# Patient Record
Sex: MALE | Race: BLACK OR AFRICAN AMERICAN | Employment: UNEMPLOYED | URBAN - METROPOLITAN AREA
[De-identification: names, ages, dates, MRNs, and addresses within clinical notes are randomized per-mention and may not be internally consistent; named-entity substitution may affect disease eponyms.]

---

## 2019-08-13 ENCOUNTER — HOSPITAL ENCOUNTER (EMERGENCY)
Age: 69
Discharge: HOME | End: 2019-08-13
Attending: EMERGENCY MEDICINE
Payer: MEDICARE

## 2019-08-13 VITALS
BODY MASS INDEX: 34.3 KG/M2 | TEMPERATURE: 97.7 F | OXYGEN SATURATION: 100 % | SYSTOLIC BLOOD PRESSURE: 115 MMHG | HEART RATE: 85 BPM | HEIGHT: 71 IN | WEIGHT: 245 LBS | RESPIRATION RATE: 18 BRPM | DIASTOLIC BLOOD PRESSURE: 75 MMHG

## 2019-08-13 DIAGNOSIS — R11.0 NAUSEA WITHOUT VOMITING: Primary | ICD-10-CM

## 2019-08-13 LAB — GLUCOSE BLD STRIP.AUTO-MCNC: 175 MG/DL (ref 65–100)

## 2019-08-13 PROCEDURE — 74011250637 HC RX REV CODE- 250/637: Performed by: EMERGENCY MEDICINE

## 2019-08-13 PROCEDURE — 82962 GLUCOSE BLOOD TEST: CPT

## 2019-08-13 PROCEDURE — 99283 EMERGENCY DEPT VISIT LOW MDM: CPT | Performed by: EMERGENCY MEDICINE

## 2019-08-13 RX ORDER — ONDANSETRON 4 MG/1
4 TABLET, ORALLY DISINTEGRATING ORAL
Qty: 11 TAB | Refills: 1 | Status: SHIPPED | OUTPATIENT
Start: 2019-08-13

## 2019-08-13 RX ORDER — ONDANSETRON 4 MG/1
4 TABLET, ORALLY DISINTEGRATING ORAL
Status: COMPLETED | OUTPATIENT
Start: 2019-08-13 | End: 2019-08-13

## 2019-08-13 RX ADMIN — ONDANSETRON 4 MG: 4 TABLET, ORALLY DISINTEGRATING ORAL at 03:45

## 2019-08-13 NOTE — ED PROVIDER NOTES
Patient is a 65-year-old male with a history of diabetes, hypertension, schizophrenia. He comes to the ER [facility] with multiple complaints. He states about 2 weeks ago he was bit by a brown snake on his right calf. But the past several days he has had some nausea and some epigastric abdominal pain. He denies any vomiting. He states he takes Prilosec regularly. He thinks he might of been dehydrated from being outside in the heat. He denies any chest pain or dyspnea. The history is provided by the patient. Snake Bite    Pertinent negatives include no back pain and no neck pain. Past Medical History:   Diagnosis Date    Arthritis     DM (diabetes mellitus) ([facility] [address]     Hypertension     Psychiatric disorder     schizophrenia       No past surgical history on file. No family history on file.     Social History     Socioeconomic History    Marital status:      Spouse name: Not on file    Number of children: Not on file    Years of education: Not on file    Highest education level: Not on file   Occupational History    Not on file   Social Needs    Financial resource strain: Not on file    Food insecurity:     Worry: Not on file     Inability: Not on file    Transportation needs:     Medical: Not on file     Non-medical: Not on file   Tobacco Use    Smoking status: Former Smoker    Smokeless tobacco: Former User     Quit date: 3/8/2005   Substance and Sexual Activity    Alcohol use: Not on file    Drug use: Not on file    Sexual activity: Not on file   Lifestyle    Physical activity:     Days per week: Not on file     Minutes per session: Not on file    Stress: Not on file   Relationships    Social connections:     Talks on phone: Not on file     Gets together: Not on file     Attends Synagogue service: Not on file     Active member of club or organization: Not on file     Attends meetings of clubs or organizations: Not on file     Relationship status: Not on file   [address] [place] [address] Intimate partner violence:     Fear of current or ex partner: Not on file     Emotionally abused: Not on file     Physically abused: Not on file     Forced sexual activity: Not on file   Other Topics Concern    Not on file   Social History Narrative    Not on file         ALLERGIES: Amoxicillin    Review of Systems   Constitutional: Negative for chills, fatigue and fever. HENT: Negative for congestion, rhinorrhea and sore throat. Eyes: Negative for pain, discharge and visual disturbance. Respiratory: Negative for cough and shortness of breath. Cardiovascular: Negative for chest pain and palpitations. Gastrointestinal: Positive for nausea. Negative for abdominal pain and diarrhea. Endocrine: Negative for polydipsia and polyuria. Genitourinary: Negative for dysuria, frequency and urgency. Musculoskeletal: Negative for back pain and neck pain. Skin: Negative for rash. Neurological: Negative for seizures, syncope and weakness. Hematological: Negative. Vitals:    08/13/19 0308   BP: 106/72   Pulse: 82   Resp: 18   Temp: 97.7 °F (36.5 °C)   SpO2: 100%   Weight: 111.1 kg (245 lb)   Height: 5' 11\" (1.803 m)            Physical Exam   Constitutional: He is oriented to person, place, and time. He appears well-developed and well-nourished. HENT:   Head: Normocephalic and atraumatic. Eyes: Pupils are equal, round, and reactive to light. Conjunctivae and EOM are normal.   Neck: Normal range of motion. Neck supple. Cardiovascular: Normal rate, regular rhythm and intact distal pulses. Pulmonary/Chest: Effort normal and breath sounds normal.   Abdominal: Soft. There is no tenderness. There is no rebound and no guarding. Musculoskeletal: Normal range of motion. He exhibits no edema or tenderness. Lymphadenopathy:     He has no cervical adenopathy. Neurological: He is alert and oriented to person, place, and time. He has normal strength. No cranial nerve deficit or sensory deficit.  GCS eye subscore is 4. GCS verbal subscore is 5. GCS motor subscore is 6. Skin: Skin is warm and dry. No rash noted. Psychiatric: His mood appears anxious. Nursing note and vitals reviewed. MDM  Number of Diagnoses or Management Options  Diagnosis management comments: Zofran for nausea here    Blood sugar 130  Completely benign abdomen. I think his schizophrenia is a primary problem. However no indication for psychiatric consult or involuntary commitment at this time. Advised him to follow-up with his doctor. Voice dictation software was used during the making of this note. This software is not perfect and grammatical and other typographical errors may be present. This note has been proofread, but may still contain errors.   Rajiv Arias MD; 8/13/2019 @3:26 AM   ===================================================================         Amount and/or Complexity of Data Reviewed  Clinical lab tests: reviewed  Review and summarize past medical records: yes  Independent visualization of images, tracings, or specimens: yes    Risk of Complications, Morbidity, and/or Mortality  Presenting problems: minimal  Diagnostic procedures: minimal  Management options: minimal    Patient Progress  Patient progress: stable         Procedures

## 2019-08-13 NOTE — ED TRIAGE NOTES
Pt states he was bit by a snake almost 2 weeks ago. States it was a brown snake on his left calve. States he came in tonight because his leg felt weak yesterday and he didn't feel good. States he was outside yesterday in the heat and got sick. Denies vomiting. Denies chest px.

## 2019-08-13 NOTE — ED NOTES
I have reviewed discharge instructions with the patient. The patient verbalized understanding. Patient left ED via Discharge Method: ambulatory to Home with self. Opportunity for questions and clarification provided. Patient given 1 scripts. To continue your aftercare when you leave the hospital, you may receive an automated call from our care team to check in on how you are doing. This is a free service and part of our promise to provide the best care and service to meet your aftercare needs.  If you have questions, or wish to unsubscribe from this service please call [phone]. Thank you for Choosing our [facility] Emergency Department.

## 2021-09-05 ENCOUNTER — HOSPITAL ENCOUNTER (EMERGENCY)
Age: 71
Discharge: HOME | End: 2021-09-05
Attending: EMERGENCY MEDICINE
Payer: MEDICARE

## 2021-09-05 VITALS
SYSTOLIC BLOOD PRESSURE: 154 MMHG | WEIGHT: 245 LBS | HEIGHT: 71 IN | RESPIRATION RATE: 17 BRPM | HEART RATE: 74 BPM | BODY MASS INDEX: 34.3 KG/M2 | TEMPERATURE: 97.5 F | DIASTOLIC BLOOD PRESSURE: 88 MMHG | OXYGEN SATURATION: 96 %

## 2021-09-05 DIAGNOSIS — Z77.098 EXPOSURE TO CHEMICAL IRRITANT: ICD-10-CM

## 2021-09-05 DIAGNOSIS — J45.20 MILD INTERMITTENT REACTIVE AIRWAY DISEASE WITHOUT COMPLICATION: ICD-10-CM

## 2021-09-05 DIAGNOSIS — H10.021 ACUTE PURULENT CONJUNCTIVITIS, RIGHT: Primary | ICD-10-CM

## 2021-09-05 DIAGNOSIS — S05.01XA ABRASION OF RIGHT CORNEA, INITIAL ENCOUNTER: ICD-10-CM

## 2021-09-05 PROCEDURE — 99283 EMERGENCY DEPT VISIT LOW MDM: CPT

## 2021-09-05 RX ORDER — ALBUTEROL SULFATE 90 UG/1
2 AEROSOL, METERED RESPIRATORY (INHALATION)
Qty: 1 EACH | Refills: 0 | Status: SHIPPED | OUTPATIENT
Start: 2021-09-05

## 2021-09-05 RX ORDER — TETRACAINE HYDROCHLORIDE 5 MG/ML
2 SOLUTION OPHTHALMIC
Status: DISCONTINUED | OUTPATIENT
Start: 2021-09-05 | End: 2021-09-05 | Stop reason: HOSPADM

## 2021-09-05 RX ORDER — GENTAMICIN SULFATE 3 MG/ML
1 SOLUTION/ DROPS OPHTHALMIC 4 TIMES DAILY
Qty: 5 ML | Refills: 0 | Status: SHIPPED | OUTPATIENT
Start: 2021-09-05

## 2021-09-05 NOTE — DISCHARGE INSTRUCTIONS
Use eyedrops as prescribed  If your eye symptoms do not improve over the next 24 to 36 hours, please contact the ophthalmologist to have a recheck appointment  Use inhaler as needed  Avoid exposure to chemicals and other irritants  Call your doctor/follow up doctor to set up appointment for recheck visit  Return to ER for any worsening symptoms or new problems which may arise

## 2021-09-05 NOTE — ED PROVIDER NOTES
The history is provided by the patient. Eye Pain   This is a new problem. The current episode started yesterday. The problem occurs constantly. The problem has been gradually worsening. The right eye is affected. The injury mechanism was a chemical exposure. The pain is mild. There is no history of trauma to the eye. There is no known exposure to pink eye. Associated symptoms include blurred vision, discharge, foreign body sensation, eye redness, itching and pain. Pertinent negatives include no numbness, no nausea, no vomiting, no fever and no dizziness. He has tried nothing for the symptoms. Past Medical History:   Diagnosis Date    Arthritis     DM [facility]diabetes mellitus) ([facility] [address]     Hypertension     Psychiatric disorder     schizophrenia       No past surgical history on file. No family history on file. Social History     Socioeconomic History    Marital status:      Spouse name: Not on file    Number of children: Not on file    Years of education: Not on file    Highest education level: Not on file   Occupational History    Not on file   Tobacco Use    Smoking status: Former Smoker    Smokeless tobacco: Former User     Quit date: 3/8/2005   Substance and Sexual Activity    Alcohol use: Not on file    Drug use: Not on file    Sexual activity: Not on file   Other Topics Concern    Not on file   Social History Narrative    Not on file     Social Determinants of Health     Financial Resource Strain:     Difficulty of Paying Living Expenses:    Food Insecurity:     Worried About [address] [employer] in the Last Year:     [address] in the Last Year:    Transportation Needs:     Lack of Transportation (Medical):      Lack of Transportation (Non-Medical):    Physical Activity:     Days of Exercise per Week:     Minutes of Exercise per Session:    Stress:     Feeling of Stress :    Social Connections:     Frequency of Communication with Friends and Family:     Frequency of Social Gatherings with Friends and Family:     Attends Sikhism Services:     Active Member of Clubs or Organizations:     Attends Club or Organization Meetings:     Marital Status:    Intimate Partner Violence:     Fear of Current or Ex-Partner:     Emotionally Abused:     Physically Abused:     Sexually Abused: ALLERGIES: Amoxicillin    Review of Systems   Constitutional: Negative for activity change, chills, diaphoresis and fever. HENT: Negative for dental problem, hearing loss, nosebleeds, rhinorrhea and sore throat. Eyes: Positive for blurred vision, pain, discharge and redness. Negative for visual disturbance. Respiratory: Positive for shortness of breath. Negative for cough and chest tightness. Cardiovascular: Negative for chest pain, palpitations and leg swelling. Gastrointestinal: Negative for abdominal pain, constipation, diarrhea, nausea and vomiting. Endocrine: Negative for cold intolerance, heat intolerance, polydipsia and polyuria. Genitourinary: Negative for dysuria and flank pain. Musculoskeletal: Negative for arthralgias, back pain, joint swelling, myalgias and neck pain. Skin: Positive for itching. Negative for pallor and rash. Allergic/Immunologic: Negative for environmental allergies and food allergies. Neurological: Negative for dizziness, tremors, light-headedness, numbness and headaches. Hematological: Negative for adenopathy. Does not bruise/bleed easily. Psychiatric/Behavioral: Negative for confusion and dysphoric mood. The patient is not nervous/anxious and is not hyperactive. All other systems reviewed and are negative. Vitals:    09/05/21 0643   BP: (!) 168/86   Pulse: 75   Resp: 20   Temp: 97.5 °F (36.4 °C)   SpO2: 97%   Weight: 111.1 kg (245 lb)   Height: 5' 11\" (1.803 m)            Physical Exam  Vitals and nursing note reviewed. Constitutional:       General: He is in acute distress. Appearance: Normal appearance.  He is well-developed. He is obese. HENT:      Head: Normocephalic and atraumatic. Right Ear: External ear normal.      Left Ear: External ear normal.      Mouth/Throat:      Mouth: Mucous membranes are moist.      Pharynx: Oropharynx is clear. No oropharyngeal exudate or posterior oropharyngeal erythema. Eyes:      General: No scleral icterus. Right eye: Discharge present. No foreign body. Left eye: No discharge. Extraocular Movements: Extraocular movements intact. Right eye: Normal extraocular motion and no nystagmus. Left eye: Normal extraocular motion and no nystagmus. Conjunctiva/sclera:      Right eye: Right conjunctiva is injected. Chemosis and exudate present. No hemorrhage. Pupils: Pupils are equal, round, and reactive to light. Neck:      Thyroid: No thyromegaly. Vascular: No JVD. Cardiovascular:      Rate and Rhythm: Normal rate and regular rhythm. Pulses: Normal pulses. Heart sounds: Normal heart sounds. Pulmonary:      Effort: Pulmonary effort is normal. No respiratory distress. Breath sounds: Normal breath sounds. No stridor. No wheezing. Musculoskeletal:         General: No tenderness or deformity. Normal range of motion. Cervical back: Normal range of motion and neck supple. Skin:     General: Skin is warm and dry. Capillary Refill: Capillary refill takes less than 2 seconds. Findings: No rash. Neurological:      General: No focal deficit present. Mental Status: He is alert and oriented to person, place, and time. Cranial Nerves: No cranial nerve deficit. Sensory: No sensory deficit. Motor: No abnormal muscle tone. Coordination: Coordination normal.   Psychiatric:         Mood and Affect: Mood normal.         Behavior: Behavior normal.         Thought Content:  Thought content normal.         Judgment: Judgment normal.          MDM  Number of Diagnoses or Management Options  Abrasion of right cornea, initial encounter: new and requires workup  Acute purulent conjunctivitis, right: new and requires workup  Exposure to chemical irritant: new and does not require workup  Mild intermittent reactive airway disease without complication: new and does not require workup  Diagnosis management comments: 79-year-old male with redness and swelling purulent discharge and itching to his right eye  Likely chemical exposure to some insecticides he was using yesterday  His lungs are clear currently although it does sound like he may have had some reactive wheezing from the exposure as well. Amount and/or Complexity of Data Reviewed  Tests in the medicine section of CPT®: ordered and reviewed  Review and summarize past medical records: yes  Independent visualization of images, tracings, or specimens: yes    Risk of Complications, Morbidity, and/or Mortality  Presenting problems: moderate  Diagnostic procedures: low  Management options: low  General comments: Elements of this note have been dictated via voice recognition software. Text and phrases may be limited by the accuracy of the software. The chart has been reviewed, but errors may still be present.       Patient Progress  Patient progress: stable         Procedures

## 2021-09-05 NOTE — ED NOTES
Pt's hair and skin have a strong scent of chemicals -- gasoline, bug spray, or paint. Pt reports that he recently sprayed his whole house with insecticide (and house perimeter with a strong outdoor insecticide). He also just last night painted his closet. However, pt noticed eye irritation prior to the painting.

## 2021-09-05 NOTE — ED NOTES
I have reviewed discharge instructions with the patient. The patient verbalized understanding. Patient left ED via Discharge Method: ambulatory to Home with self  Opportunity for questions and clarification provided. Patient given 2 scripts. To continue your aftercare when you leave the hospital, you may receive an automated call from our care team to check in on how you are doing. This is a free service and part of our promise to provide the best care and service to meet your aftercare needs.  If you have questions, or wish to unsubscribe from this service please call [phone]. Thank you for Choosing our [facility] Emergency Department.

## 2021-09-05 NOTE — ED TRIAGE NOTES
Arrives with face mask in place. Reports possible insect bite to back of neck Friday. Reports right eye pain with redness, swelling and drainage. States \"feel like it got a rock in it\". Denies injury/trauma. Also reports \"my air feels weird, like its dirty or something\".

## 2023-04-26 ENCOUNTER — APPOINTMENT (OUTPATIENT)
Dept: CT IMAGING | Age: 73
End: 2023-04-26
Payer: MEDICARE

## 2023-04-26 ENCOUNTER — APPOINTMENT (OUTPATIENT)
Dept: GENERAL RADIOLOGY | Age: 73
End: 2023-04-26
Payer: MEDICARE

## 2023-04-26 ENCOUNTER — HOSPITAL ENCOUNTER (INPATIENT)
Age: 73
LOS: 3 days | Discharge: HOME | End: 2023-04-29
Attending: EMERGENCY MEDICINE | Admitting: INTERNAL MEDICINE
Payer: MEDICARE

## 2023-04-26 DIAGNOSIS — E11.65 UNCONTROLLED TYPE 2 DIABETES MELLITUS WITH HYPERGLYCEMIA (HCC): ICD-10-CM

## 2023-04-26 DIAGNOSIS — N17.9 ACUTE KIDNEY INJURY (HCC): Primary | ICD-10-CM

## 2023-04-26 DIAGNOSIS — E11.10 DIABETIC KETOACIDOSIS WITHOUT COMA ASSOCIATED WITH TYPE 2 DIABETES MELLITUS (HCC): ICD-10-CM

## 2023-04-26 PROBLEM — E11.9 DIABETES (HCC): Status: ACTIVE | Noted: 2023-04-26

## 2023-04-26 PROBLEM — E66.9 OBESITY: Status: ACTIVE | Noted: 2023-04-26

## 2023-04-26 PROBLEM — R73.9 HYPERGLYCEMIA: Status: ACTIVE | Noted: 2023-04-26

## 2023-04-26 LAB
ALBUMIN SERPL-MCNC: 3.9 G/DL (ref 3.2–4.6)
ALBUMIN/GLOB SERPL: 0.9 (ref 0.4–1.6)
ALP SERPL-CCNC: 83 U/L (ref 50–136)
ALT SERPL-CCNC: 34 U/L (ref 12–65)
ANION GAP SERPL CALC-SCNC: 12 MMOL/L (ref 2–11)
AST SERPL-CCNC: 27 U/L (ref 15–37)
BASOPHILS # BLD: 0.1 K/UL (ref 0–0.2)
BASOPHILS NFR BLD: 1 % (ref 0–2)
BILIRUB SERPL-MCNC: 0.6 MG/DL (ref 0.2–1.1)
BILIRUB UR QL: NEGATIVE
BUN SERPL-MCNC: 48 MG/DL (ref 8–23)
CALCIUM SERPL-MCNC: 9.7 MG/DL (ref 8.3–10.4)
CHLORIDE SERPL-SCNC: 91 MMOL/L (ref 101–110)
CHP ED QC CHECK: NORMAL
CO2 SERPL-SCNC: 20 MMOL/L (ref 21–32)
CREAT SERPL-MCNC: 2.8 MG/DL (ref 0.8–1.5)
DIFFERENTIAL METHOD BLD: ABNORMAL
EOSINOPHIL # BLD: 0.1 K/UL (ref 0–0.8)
EOSINOPHIL NFR BLD: 1 % (ref 0.5–7.8)
ERYTHROCYTE [DISTWIDTH] IN BLOOD BY AUTOMATED COUNT: 12.6 % (ref 11.9–14.6)
GLOBULIN SER CALC-MCNC: 4.5 G/DL (ref 2.8–4.5)
GLUCOSE BLD STRIP.AUTO-MCNC: 547 MG/DL (ref 65–100)
GLUCOSE BLD-MCNC: NORMAL MG/DL
GLUCOSE SERPL-MCNC: 827 MG/DL (ref 65–100)
GLUCOSE UR QL STRIP.AUTO: 500 MG/DL
HCT VFR BLD AUTO: 48.5 % (ref 41.1–50.3)
HGB BLD-MCNC: 16.5 G/DL (ref 13.6–17.2)
IMM GRANULOCYTES # BLD AUTO: 0 K/UL (ref 0–0.5)
IMM GRANULOCYTES NFR BLD AUTO: 0 % (ref 0–5)
KETONES UR-MCNC: NEGATIVE MG/DL
LACTATE SERPL-SCNC: 2 MMOL/L (ref 0.4–2)
LACTATE SERPL-SCNC: 2.3 MMOL/L (ref 0.4–2)
LEUKOCYTE ESTERASE UR QL STRIP: NEGATIVE
LYMPHOCYTES # BLD: 1.4 K/UL (ref 0.5–4.6)
LYMPHOCYTES NFR BLD: 12 % (ref 13–44)
MCH RBC QN AUTO: 27.9 PG (ref 26.1–32.9)
MCHC RBC AUTO-ENTMCNC: 34 G/DL (ref 31.4–35)
MCV RBC AUTO: 81.9 FL (ref 82–102)
MONOCYTES # BLD: 0.7 K/UL (ref 0.1–1.3)
MONOCYTES NFR BLD: 6 % (ref 4–12)
NEUTS SEG # BLD: 9.4 K/UL (ref 1.7–8.2)
NEUTS SEG NFR BLD: 80 % (ref 43–78)
NITRITE UR QL: NEGATIVE
NRBC # BLD: 0 K/UL (ref 0–0.2)
PH UR: 5 (ref 5–9)
PLATELET # BLD AUTO: 278 K/UL (ref 150–450)
PMV BLD AUTO: 11.3 FL (ref 9.4–12.3)
POTASSIUM SERPL-SCNC: 5.1 MMOL/L (ref 3.5–5.1)
PROT SERPL-MCNC: 8.4 G/DL (ref 6.3–8.2)
PROT UR QL: ABNORMAL MG/DL
RBC # BLD AUTO: 5.92 M/UL (ref 4.23–5.6)
RBC # UR STRIP: ABNORMAL
SERVICE CMNT-IMP: ABNORMAL
SERVICE CMNT-IMP: ABNORMAL
SODIUM SERPL-SCNC: 123 MMOL/L (ref 133–143)
SP GR UR: 1.02 (ref 1–1.02)
UROBILINOGEN UR QL: 0.2 EU/DL (ref 0.2–1)
WBC # BLD AUTO: 11.7 K/UL (ref 4.3–11.1)

## 2023-04-26 PROCEDURE — 81003 URINALYSIS AUTO W/O SCOPE: CPT

## 2023-04-26 PROCEDURE — 82962 GLUCOSE BLOOD TEST: CPT

## 2023-04-26 PROCEDURE — 83036 HEMOGLOBIN GLYCOSYLATED A1C: CPT

## 2023-04-26 PROCEDURE — 83605 ASSAY OF LACTIC ACID: CPT

## 2023-04-26 PROCEDURE — 6360000002 HC RX W HCPCS: Performed by: PHYSICIAN ASSISTANT

## 2023-04-26 PROCEDURE — 99285 EMERGENCY DEPT VISIT HI MDM: CPT | Performed by: EMERGENCY MEDICINE

## 2023-04-26 PROCEDURE — 6370000000 HC RX 637 (ALT 250 FOR IP): Performed by: PHYSICIAN ASSISTANT

## 2023-04-26 PROCEDURE — 80053 COMPREHEN METABOLIC PANEL: CPT

## 2023-04-26 PROCEDURE — 1100000000 HC RM PRIVATE

## 2023-04-26 PROCEDURE — 6370000000 HC RX 637 (ALT 250 FOR IP): Performed by: INTERNAL MEDICINE

## 2023-04-26 PROCEDURE — 74176 CT ABD & PELVIS W/O CONTRAST: CPT

## 2023-04-26 PROCEDURE — 96374 THER/PROPH/DIAG INJ IV PUSH: CPT | Performed by: EMERGENCY MEDICINE

## 2023-04-26 PROCEDURE — 2580000003 HC RX 258: Performed by: INTERNAL MEDICINE

## 2023-04-26 PROCEDURE — 96361 HYDRATE IV INFUSION ADD-ON: CPT | Performed by: EMERGENCY MEDICINE

## 2023-04-26 PROCEDURE — 2580000003 HC RX 258: Performed by: PHYSICIAN ASSISTANT

## 2023-04-26 PROCEDURE — 74022 RADEX COMPL AQT ABD SERIES: CPT

## 2023-04-26 PROCEDURE — 85025 COMPLETE CBC W/AUTO DIFF WBC: CPT

## 2023-04-26 PROCEDURE — 87040 BLOOD CULTURE FOR BACTERIA: CPT

## 2023-04-26 RX ORDER — SODIUM CHLORIDE 9 MG/ML
INJECTION, SOLUTION INTRAVENOUS CONTINUOUS
Status: DISCONTINUED | OUTPATIENT
Start: 2023-04-26 | End: 2023-04-27

## 2023-04-26 RX ORDER — SODIUM CHLORIDE 9 MG/ML
INJECTION, SOLUTION INTRAVENOUS PRN
Status: DISCONTINUED | OUTPATIENT
Start: 2023-04-26 | End: 2023-04-29 | Stop reason: HOSPADM

## 2023-04-26 RX ORDER — ACETAMINOPHEN 650 MG/1
650 SUPPOSITORY RECTAL EVERY 6 HOURS PRN
Status: DISCONTINUED | OUTPATIENT
Start: 2023-04-26 | End: 2023-04-29 | Stop reason: HOSPADM

## 2023-04-26 RX ORDER — SODIUM CHLORIDE 0.9 % (FLUSH) 0.9 %
5-40 SYRINGE (ML) INJECTION PRN
Status: DISCONTINUED | OUTPATIENT
Start: 2023-04-26 | End: 2023-04-29 | Stop reason: HOSPADM

## 2023-04-26 RX ORDER — DEXTROSE AND SODIUM CHLORIDE 5; .45 G/100ML; G/100ML
INJECTION, SOLUTION INTRAVENOUS CONTINUOUS PRN
Status: DISCONTINUED | OUTPATIENT
Start: 2023-04-26 | End: 2023-04-27

## 2023-04-26 RX ORDER — HEPARIN SODIUM 5000 [USP'U]/ML
5000 INJECTION, SOLUTION INTRAVENOUS; SUBCUTANEOUS EVERY 8 HOURS SCHEDULED
Status: DISCONTINUED | OUTPATIENT
Start: 2023-04-27 | End: 2023-04-29 | Stop reason: HOSPADM

## 2023-04-26 RX ORDER — ACETAMINOPHEN 325 MG/1
650 TABLET ORAL EVERY 6 HOURS PRN
Status: DISCONTINUED | OUTPATIENT
Start: 2023-04-26 | End: 2023-04-29 | Stop reason: HOSPADM

## 2023-04-26 RX ORDER — 0.9 % SODIUM CHLORIDE 0.9 %
1000 INTRAVENOUS SOLUTION INTRAVENOUS ONCE
Status: COMPLETED | OUTPATIENT
Start: 2023-04-26 | End: 2023-04-26

## 2023-04-26 RX ORDER — SODIUM CHLORIDE 9 MG/ML
INJECTION, SOLUTION INTRAVENOUS CONTINUOUS
Status: DISCONTINUED | OUTPATIENT
Start: 2023-04-26 | End: 2023-04-26

## 2023-04-26 RX ORDER — ONDANSETRON 2 MG/ML
4 INJECTION INTRAMUSCULAR; INTRAVENOUS EVERY 6 HOURS PRN
Status: DISCONTINUED | OUTPATIENT
Start: 2023-04-26 | End: 2023-04-29 | Stop reason: HOSPADM

## 2023-04-26 RX ORDER — ENOXAPARIN SODIUM 100 MG/ML
30 INJECTION SUBCUTANEOUS 2 TIMES DAILY
Status: DISCONTINUED | OUTPATIENT
Start: 2023-04-26 | End: 2023-04-26 | Stop reason: ALTCHOICE

## 2023-04-26 RX ORDER — ONDANSETRON 4 MG/1
4 TABLET, ORALLY DISINTEGRATING ORAL EVERY 8 HOURS PRN
Status: DISCONTINUED | OUTPATIENT
Start: 2023-04-26 | End: 2023-04-29 | Stop reason: HOSPADM

## 2023-04-26 RX ORDER — POTASSIUM CHLORIDE 7.45 MG/ML
10 INJECTION INTRAVENOUS PRN
Status: DISCONTINUED | OUTPATIENT
Start: 2023-04-26 | End: 2023-04-29 | Stop reason: HOSPADM

## 2023-04-26 RX ORDER — SODIUM CHLORIDE 0.9 % (FLUSH) 0.9 %
5-40 SYRINGE (ML) INJECTION EVERY 12 HOURS SCHEDULED
Status: DISCONTINUED | OUTPATIENT
Start: 2023-04-26 | End: 2023-04-29 | Stop reason: HOSPADM

## 2023-04-26 RX ORDER — MAGNESIUM SULFATE 1 G/100ML
1000 INJECTION INTRAVENOUS PRN
Status: DISCONTINUED | OUTPATIENT
Start: 2023-04-26 | End: 2023-04-29 | Stop reason: HOSPADM

## 2023-04-26 RX ORDER — 0.9 % SODIUM CHLORIDE 0.9 %
1000 INTRAVENOUS SOLUTION INTRAVENOUS
Status: COMPLETED | OUTPATIENT
Start: 2023-04-26 | End: 2023-04-26

## 2023-04-26 RX ORDER — 0.9 % SODIUM CHLORIDE 0.9 %
15 INTRAVENOUS SOLUTION INTRAVENOUS ONCE
Status: DISCONTINUED | OUTPATIENT
Start: 2023-04-26 | End: 2023-04-26

## 2023-04-26 RX ORDER — ONDANSETRON 2 MG/ML
4 INJECTION INTRAMUSCULAR; INTRAVENOUS
Status: COMPLETED | OUTPATIENT
Start: 2023-04-26 | End: 2023-04-26

## 2023-04-26 RX ORDER — POLYETHYLENE GLYCOL 3350 17 G/17G
17 POWDER, FOR SOLUTION ORAL DAILY PRN
Status: DISCONTINUED | OUTPATIENT
Start: 2023-04-26 | End: 2023-04-29 | Stop reason: HOSPADM

## 2023-04-26 RX ADMIN — SODIUM CHLORIDE: 9 INJECTION, SOLUTION INTRAVENOUS at 23:19

## 2023-04-26 RX ADMIN — ONDANSETRON 4 MG: 2 INJECTION INTRAMUSCULAR; INTRAVENOUS at 19:53

## 2023-04-26 RX ADMIN — SODIUM CHLORIDE 1000 ML: 9 INJECTION, SOLUTION INTRAVENOUS at 20:31

## 2023-04-26 RX ADMIN — SODIUM CHLORIDE 1000 ML: 9 INJECTION, SOLUTION INTRAVENOUS at 18:11

## 2023-04-26 RX ADMIN — INSULIN HUMAN 8 UNITS: 100 INJECTION, SOLUTION PARENTERAL at 19:58

## 2023-04-26 RX ADMIN — SODIUM CHLORIDE 9.7 UNITS/HR: 9 INJECTION, SOLUTION INTRAVENOUS at 22:15

## 2023-04-26 RX ADMIN — SODIUM CHLORIDE 16.2 UNITS/HR: 9 INJECTION, SOLUTION INTRAVENOUS at 23:19

## 2023-04-26 ASSESSMENT — ENCOUNTER SYMPTOMS
COLOR CHANGE: 0
SHORTNESS OF BREATH: 0
CONSTIPATION: 1
VOMITING: 0
ABDOMINAL PAIN: 1
NAUSEA: 1

## 2023-04-26 ASSESSMENT — PAIN DESCRIPTION - LOCATION: LOCATION: ABDOMEN

## 2023-04-26 ASSESSMENT — PAIN - FUNCTIONAL ASSESSMENT: PAIN_FUNCTIONAL_ASSESSMENT: 0-10

## 2023-04-26 ASSESSMENT — PAIN SCALES - GENERAL: PAINLEVEL_OUTOF10: 5

## 2023-04-26 NOTE — ED TRIAGE NOTES
Reports x3 weeks of \"stomach virus\". Reports abd pain, weakness and cough with clear mucous. Has not been taking his insulin lately.  Unable to get oral temp or axillary temp in triage

## 2023-04-27 LAB
ANION GAP SERPL CALC-SCNC: 3 MMOL/L (ref 2–11)
ANION GAP SERPL CALC-SCNC: 5 MMOL/L (ref 2–11)
ANION GAP SERPL CALC-SCNC: 5 MMOL/L (ref 2–11)
ANION GAP SERPL CALC-SCNC: 7 MMOL/L (ref 2–11)
BUN SERPL-MCNC: 37 MG/DL (ref 8–23)
BUN SERPL-MCNC: 42 MG/DL (ref 8–23)
BUN SERPL-MCNC: 42 MG/DL (ref 8–23)
BUN SERPL-MCNC: 48 MG/DL (ref 8–23)
CALCIUM SERPL-MCNC: 7.2 MG/DL (ref 8.3–10.4)
CALCIUM SERPL-MCNC: 8.4 MG/DL (ref 8.3–10.4)
CALCIUM SERPL-MCNC: 8.8 MG/DL (ref 8.3–10.4)
CALCIUM SERPL-MCNC: 9 MG/DL (ref 8.3–10.4)
CHLORIDE SERPL-SCNC: 104 MMOL/L (ref 101–110)
CHLORIDE SERPL-SCNC: 108 MMOL/L (ref 101–110)
CHLORIDE SERPL-SCNC: 111 MMOL/L (ref 101–110)
CHLORIDE SERPL-SCNC: 113 MMOL/L (ref 101–110)
CO2 SERPL-SCNC: 18 MMOL/L (ref 21–32)
CO2 SERPL-SCNC: 21 MMOL/L (ref 21–32)
CREAT SERPL-MCNC: 2 MG/DL (ref 0.8–1.5)
CREAT SERPL-MCNC: 2.2 MG/DL (ref 0.8–1.5)
CREAT SERPL-MCNC: 2.2 MG/DL (ref 0.8–1.5)
CREAT SERPL-MCNC: 2.3 MG/DL (ref 0.8–1.5)
EST. AVERAGE GLUCOSE BLD GHB EST-MCNC: 309 MG/DL
EST. AVERAGE GLUCOSE BLD GHB EST-MCNC: 326 MG/DL
GLUCOSE BLD STRIP.AUTO-MCNC: 153 MG/DL (ref 65–100)
GLUCOSE BLD STRIP.AUTO-MCNC: 181 MG/DL (ref 65–100)
GLUCOSE BLD STRIP.AUTO-MCNC: 222 MG/DL (ref 65–100)
GLUCOSE BLD STRIP.AUTO-MCNC: 247 MG/DL (ref 65–100)
GLUCOSE BLD STRIP.AUTO-MCNC: 298 MG/DL (ref 65–100)
GLUCOSE BLD STRIP.AUTO-MCNC: 324 MG/DL (ref 65–100)
GLUCOSE BLD STRIP.AUTO-MCNC: 326 MG/DL (ref 65–100)
GLUCOSE BLD STRIP.AUTO-MCNC: 353 MG/DL (ref 65–100)
GLUCOSE BLD STRIP.AUTO-MCNC: 375 MG/DL (ref 65–100)
GLUCOSE BLD STRIP.AUTO-MCNC: 448 MG/DL (ref 65–100)
GLUCOSE BLD STRIP.AUTO-MCNC: 458 MG/DL (ref 65–100)
GLUCOSE BLD STRIP.AUTO-MCNC: >600 MG/DL (ref 65–100)
GLUCOSE SERPL-MCNC: 213 MG/DL (ref 65–100)
GLUCOSE SERPL-MCNC: 225 MG/DL (ref 65–100)
GLUCOSE SERPL-MCNC: 299 MG/DL (ref 65–100)
GLUCOSE SERPL-MCNC: 472 MG/DL (ref 65–100)
HBA1C MFR BLD: 12.4 % (ref 4.8–5.6)
HBA1C MFR BLD: 13 % (ref 4.8–5.6)
MAGNESIUM SERPL-MCNC: 2.3 MG/DL (ref 1.8–2.4)
MAGNESIUM SERPL-MCNC: 2.7 MG/DL (ref 1.8–2.4)
MAGNESIUM SERPL-MCNC: 2.8 MG/DL (ref 1.8–2.4)
PHOSPHATE SERPL-MCNC: 2.9 MG/DL (ref 2.3–3.7)
PHOSPHATE SERPL-MCNC: 3 MG/DL (ref 2.3–3.7)
PHOSPHATE SERPL-MCNC: 3.8 MG/DL (ref 2.3–3.7)
PHOSPHATE SERPL-MCNC: 4 MG/DL (ref 2.3–3.7)
PHOSPHATE SERPL-MCNC: 4 MG/DL (ref 2.3–3.7)
POTASSIUM SERPL-SCNC: 3.1 MMOL/L (ref 3.5–5.1)
POTASSIUM SERPL-SCNC: 4 MMOL/L (ref 3.5–5.1)
POTASSIUM SERPL-SCNC: 4.7 MMOL/L (ref 3.5–5.1)
POTASSIUM SERPL-SCNC: 6.5 MMOL/L (ref 3.5–5.1)
SERVICE CMNT-IMP: ABNORMAL
SODIUM SERPL-SCNC: 128 MMOL/L (ref 133–143)
SODIUM SERPL-SCNC: 134 MMOL/L (ref 133–143)
SODIUM SERPL-SCNC: 137 MMOL/L (ref 133–143)
SODIUM SERPL-SCNC: 138 MMOL/L (ref 133–143)

## 2023-04-27 PROCEDURE — 2580000003 HC RX 258: Performed by: INTERNAL MEDICINE

## 2023-04-27 PROCEDURE — 80048 BASIC METABOLIC PNL TOTAL CA: CPT

## 2023-04-27 PROCEDURE — 84100 ASSAY OF PHOSPHORUS: CPT

## 2023-04-27 PROCEDURE — 6360000002 HC RX W HCPCS: Performed by: INTERNAL MEDICINE

## 2023-04-27 PROCEDURE — 6370000000 HC RX 637 (ALT 250 FOR IP): Performed by: INTERNAL MEDICINE

## 2023-04-27 PROCEDURE — 82962 GLUCOSE BLOOD TEST: CPT

## 2023-04-27 PROCEDURE — 1100000000 HC RM PRIVATE

## 2023-04-27 PROCEDURE — 83735 ASSAY OF MAGNESIUM: CPT

## 2023-04-27 PROCEDURE — 83036 HEMOGLOBIN GLYCOSYLATED A1C: CPT

## 2023-04-27 PROCEDURE — 87040 BLOOD CULTURE FOR BACTERIA: CPT

## 2023-04-27 PROCEDURE — 6370000000 HC RX 637 (ALT 250 FOR IP): Performed by: FAMILY MEDICINE

## 2023-04-27 PROCEDURE — 36415 COLL VENOUS BLD VENIPUNCTURE: CPT

## 2023-04-27 RX ORDER — SODIUM CHLORIDE 9 MG/ML
INJECTION, SOLUTION INTRAVENOUS CONTINUOUS
Status: DISCONTINUED | OUTPATIENT
Start: 2023-04-27 | End: 2023-04-28

## 2023-04-27 RX ORDER — INSULIN LISPRO 100 [IU]/ML
0-8 INJECTION, SOLUTION INTRAVENOUS; SUBCUTANEOUS
Status: DISCONTINUED | OUTPATIENT
Start: 2023-04-27 | End: 2023-04-29 | Stop reason: HOSPADM

## 2023-04-27 RX ORDER — INSULIN LISPRO 100 [IU]/ML
8 INJECTION, SOLUTION INTRAVENOUS; SUBCUTANEOUS ONCE
Status: COMPLETED | OUTPATIENT
Start: 2023-04-27 | End: 2023-04-27

## 2023-04-27 RX ORDER — INSULIN GLARGINE 100 [IU]/ML
12 INJECTION, SOLUTION SUBCUTANEOUS 2 TIMES DAILY
Status: DISCONTINUED | OUTPATIENT
Start: 2023-04-27 | End: 2023-04-27

## 2023-04-27 RX ORDER — INSULIN GLARGINE 100 [IU]/ML
15 INJECTION, SOLUTION SUBCUTANEOUS 2 TIMES DAILY
Status: DISCONTINUED | OUTPATIENT
Start: 2023-04-27 | End: 2023-04-28

## 2023-04-27 RX ORDER — INSULIN LISPRO 100 [IU]/ML
0-4 INJECTION, SOLUTION INTRAVENOUS; SUBCUTANEOUS NIGHTLY
Status: DISCONTINUED | OUTPATIENT
Start: 2023-04-27 | End: 2023-04-29 | Stop reason: HOSPADM

## 2023-04-27 RX ORDER — INSULIN GLARGINE 100 [IU]/ML
10 INJECTION, SOLUTION SUBCUTANEOUS DAILY
Status: DISCONTINUED | OUTPATIENT
Start: 2023-04-27 | End: 2023-04-27

## 2023-04-27 RX ORDER — DEXTROSE MONOHYDRATE 100 MG/ML
INJECTION, SOLUTION INTRAVENOUS CONTINUOUS PRN
Status: DISCONTINUED | OUTPATIENT
Start: 2023-04-27 | End: 2023-04-29 | Stop reason: HOSPADM

## 2023-04-27 RX ORDER — INSULIN LISPRO 100 [IU]/ML
5 INJECTION, SOLUTION INTRAVENOUS; SUBCUTANEOUS
Status: DISCONTINUED | OUTPATIENT
Start: 2023-04-27 | End: 2023-04-28

## 2023-04-27 RX ORDER — INSULIN GLARGINE 100 [IU]/ML
10 INJECTION, SOLUTION SUBCUTANEOUS 2 TIMES DAILY
Status: DISCONTINUED | OUTPATIENT
Start: 2023-04-27 | End: 2023-04-27

## 2023-04-27 RX ADMIN — SODIUM CHLORIDE: 9 INJECTION, SOLUTION INTRAVENOUS at 14:26

## 2023-04-27 RX ADMIN — HEPARIN SODIUM 5000 UNITS: 5000 INJECTION INTRAVENOUS; SUBCUTANEOUS at 22:01

## 2023-04-27 RX ADMIN — SODIUM CHLORIDE, PRESERVATIVE FREE 10 ML: 5 INJECTION INTRAVENOUS at 22:01

## 2023-04-27 RX ADMIN — INSULIN GLARGINE 15 UNITS: 100 INJECTION, SOLUTION SUBCUTANEOUS at 21:57

## 2023-04-27 RX ADMIN — INSULIN LISPRO 8 UNITS: 100 INJECTION, SOLUTION INTRAVENOUS; SUBCUTANEOUS at 14:25

## 2023-04-27 RX ADMIN — HEPARIN SODIUM 5000 UNITS: 5000 INJECTION INTRAVENOUS; SUBCUTANEOUS at 05:38

## 2023-04-27 RX ADMIN — INSULIN LISPRO 5 UNITS: 100 INJECTION, SOLUTION INTRAVENOUS; SUBCUTANEOUS at 17:11

## 2023-04-27 RX ADMIN — SODIUM CHLORIDE, PRESERVATIVE FREE 10 ML: 5 INJECTION INTRAVENOUS at 05:19

## 2023-04-27 RX ADMIN — POTASSIUM CHLORIDE 10 MEQ: 7.46 INJECTION, SOLUTION INTRAVENOUS at 05:10

## 2023-04-27 RX ADMIN — POTASSIUM CHLORIDE 10 MEQ: 7.46 INJECTION, SOLUTION INTRAVENOUS at 06:32

## 2023-04-27 RX ADMIN — INSULIN LISPRO 7 UNITS: 100 INJECTION, SOLUTION INTRAVENOUS; SUBCUTANEOUS at 17:11

## 2023-04-27 RX ADMIN — INSULIN LISPRO 4 UNITS: 100 INJECTION, SOLUTION INTRAVENOUS; SUBCUTANEOUS at 21:58

## 2023-04-27 RX ADMIN — SODIUM ZIRCONIUM CYCLOSILICATE 10 G: 5 POWDER, FOR SUSPENSION ORAL at 14:26

## 2023-04-27 RX ADMIN — SODIUM CHLORIDE: 9 INJECTION, SOLUTION INTRAVENOUS at 09:36

## 2023-04-27 RX ADMIN — DEXTROSE AND SODIUM CHLORIDE: 5; 450 INJECTION, SOLUTION INTRAVENOUS at 02:56

## 2023-04-27 RX ADMIN — POTASSIUM CHLORIDE 10 MEQ: 7.46 INJECTION, SOLUTION INTRAVENOUS at 08:02

## 2023-04-27 RX ADMIN — INSULIN LISPRO 8 UNITS: 100 INJECTION, SOLUTION INTRAVENOUS; SUBCUTANEOUS at 12:28

## 2023-04-27 RX ADMIN — POLYETHYLENE GLYCOL 3350 17 G: 17 POWDER, FOR SOLUTION ORAL at 12:30

## 2023-04-27 RX ADMIN — DEXTROSE AND SODIUM CHLORIDE: 5; 450 INJECTION, SOLUTION INTRAVENOUS at 10:25

## 2023-04-27 RX ADMIN — INSULIN GLARGINE 10 UNITS: 100 INJECTION, SOLUTION SUBCUTANEOUS at 09:35

## 2023-04-27 ASSESSMENT — PAIN SCALES - GENERAL
PAINLEVEL_OUTOF10: 0
PAINLEVEL_OUTOF10: 0

## 2023-04-27 NOTE — PROGRESS NOTES
TRANSFER - IN REPORT:    Verbal report received from [facility] on Bienvenido Crews  being received from ED for routine progression of patient care      Report consisted of patient's Situation, Background, Assessment and   Recommendations(SBAR). Information from the following report(s) ED Encounter Summary, ED SBAR, [facility], and Recent Results was reviewed with the receiving nurse. Opportunity for questions and clarification was provided. Assessment completed upon patient's arrival to unit and care assumed.

## 2023-04-27 NOTE — H&P
Hospitalist History and Physical   Admit Date:  2023  4:49 PM   Name:  Cassandra Gu   Age:  81 y.o. Sex:  male  :  1950   MRN:  [id number]   Room[facility]  [facility]    Presenting/Chief Complaint: Abdominal Pain     Reason(s) for Admission: Hyperglycemia [R73.9]     History of Present Illness:   Cassandra Gu is a 81 y.o. male with medical history of   Beatties mellitus  BMI of 34    who presented with abdominal pain for the past few days. Patient has diabetes mellitus and supposed to be on insulin regimen. He realized that he has some local hematoma on the abdomen and his both hands are looking darker and he blames insulin injection to be the reason, so he stopped taking insulin for the past 2 months. Patient emergency room today complaining of nausea, abdominal pain, constipation for the past few days. He has no fever. He has no shaking chills. No shortness of breath. No chest pain. No blood per rectum. No fever. No diarrhea. .      He does not check his blood sugar at home. Lab work shows sugar in 800 range. Also with hyponatremia with sodium of 123. Creatinine of 2.8. Lactic acid of 2.3. Blood cells 11.7 hemoglobin 16.5. Bicarbonate is 20. No significant metabolic acidosis. CT abdomen and pelvis shows no acute abnormalities. Chest x-ray shows no acute findings. Hospitalist service is requested to admit the patient for uncontrolled DM. Assessment & Plan:     Principal Problem:    Hyperglycemia  Plan: This is due to him missing insulin for the past few months. Admit to ICU. IV fluid. Initiate glucose stabilizer protocol. Monitor blood sugar and electrolytes as per protocol. Further treatment or plan of management will depend on patient's response. Active Problems:    Hyponatremia  Plan: This is likely secondary to hyperglycemia. Continue with normal saline now and monitor sodium level.   It is likely that sodium will improve once sugar is better

## 2023-04-27 NOTE — ED NOTES
Messaged Dr. Amor Gary, was overriden to message - asking about the 1666mL fluids ordered on pt as pt has already received 2L NS as an ED patient. This RN is messaging to verify that the provider wants the additional 1666mL administered. Awaiting response at this time.      Raymond Barnes, RN  04/26/23 8229
Rechecked pt BGL via fingerstick, result read \"HI\". [facility] notified.      Kamini Quinones RN  04/26/23 2035
Report given to [facility], RN  04/27/23 1066
TRANSFER - OUT REPORT:    Verbal report given to 5th floor RN on Rolly Davenport  being transferred to 5th floor for routine progression of patient care       Report consisted of patient's Situation, Background, Assessment and   Recommendations(SBAR). Information from the following report(s) Nurse Handoff Report was reviewed with the receiving nurse. [place] Assessment: Presents to emergency department  because of falls (Syncope, seizure, or loss of consciousness): No, Age > 79: Yes, Altered Mental Status, Intoxication with alcohol or substance confusion (Disorientation, impaired judgment, poor safety awaremess, or inability to follow instructions): No, Impaired Mobility: Ambulates or transfers with assistive devices or assistance; Unable to ambulate or transer.: No, Nursing Judgement: Yes  Lines:   Peripheral IV 04/26/23 Right Antecubital (Active)   Site Assessment Clean, dry & intact 04/26/23 1706   Line Status Blood return noted 04/26/23 1706   Phlebitis Assessment No symptoms 04/26/23 1706   Infiltration Assessment 0 04/26/23 1706   Alcohol Cap Used No 04/26/23 1706   Dressing Status New dressing applied 04/26/23 1706   Dressing Type Transparent 04/26/23 1706   Dressing Intervention New 04/26/23 1706       Peripheral IV 04/26/23 Left Hand (Active)   Site Assessment Clean, dry & intact 04/26/23 1942        Opportunity for questions and clarification was provided.       Patient transported with:  Yvette Kraft RN  04/27/23 7971
Warming blanket applied to chaim Washington, JOSHUA  04/26/23 0154
Chloride 91 (L) 101 - 110 mmol/L    CO2 20 (L) 21 - 32 mmol/L    Anion Gap 12 (H) 2 - 11 mmol/L    Glucose 827 (HH) 65 - 100 mg/dL    BUN 48 (H) 8 - 23 MG/DL    Creatinine 2.80 (H) 0.8 - 1.5 MG/DL    Est, Glom Filt Rate 24 (L) >60 ml/min/1.73m2    Calcium 9.7 8.3 - 10.4 MG/DL    Total Bilirubin 0.6 0.2 - 1.1 MG/DL    ALT 34 12 - 65 U/L    AST 27 15 - 37 U/L    Alk Phosphatase 83 50 - 136 U/L    Total Protein 8.4 (H) 6.3 - 8.2 g/dL    Albumin 3.9 3.2 - 4.6 g/dL    Globulin 4.5 2.8 - 4.5 g/dL    Albumin/Globulin Ratio 0.9 0.4 - 1.6     Lactic Acid   Result Value Ref Range    Lactic Acid, Plasma 2.3 (H) 0.4 - 2.0 MMOL/L   POCT Glucose   Result Value Ref Range    Glucose  mg/dL    QC OK? ok    POCT Urinalysis no Micro   Result Value Ref Range    Specific Gravity, Urine, POC 1.025 (H) 1.001 - 1.023      pH, Urine, POC 5.0 5.0 - 9.0      Protein, Urine, POC TRACE (A) NEG mg/dL    Glucose, UA  (A) NEG mg/dL    Ketones, Urine, POC Negative NEG mg/dL    Bilirubin, Urine, POC Negative NEG      Blood, UA POC Trace Intact (A) NEG      URINE UROBILINOGEN POC 0.2 0.2 - 1.0 EU/dL    Nitrite, Urine, POC Negative NEG      Leukocyte Est, UA POC Negative NEG      Performed by: Sindi Star         CT ABDOMEN PELVIS WO CONTRAST Additional Contrast? None   Final Result   1. There are no acute findings. 2.  Atherosclerotic abdominal aorta. Lance Cabral M.D.    4/26/2023 8:29:00 PM      XR ACUTE ABD SERIES CHEST 1 VW   Final Result      No evidence of distended bowel free air. Alissa Demarco M.D.    4/26/2023 6:27:00 PM                        Voice dictation software was used during the making of this note. This software is not perfect and grammatical and other typographical errors may be present. This note has not been completely proofread for errors.       Dave Mejia  04/26/23 2050

## 2023-04-27 NOTE — DIABETES MGMT
Patient admitted with hyperglycemia. Admit blood glucose 827. HgbA1C 12.4 (eAG 309). Blood glucose ranged 458-827 yesterday with patient receiving insulin drip. Blood glucose this morning was 173. Creatinine 2.20. GFR 31. Reviewed patient current regimen: Lantus 10 units daily and Humalog correctional insulin. Noted patient NPO. When diet resumed, patient would likely benefit from an increase in basal insulin to weight-based 0.25 units/kg to improve glycemic control. Update 1337:  Blood glucose now 375. Patient has Regular diet with 3 carb choices ordered. Patient would likely benefit from initiation of prandial insulin to help offset hyperglycemia during the day related to caloric intake. Provider updated via [employer] regarding recommendations and glycemic control. Patient seen for assessment regarding diabetes management. Patient has a past medical history of DM, HLD, HTN, schizophrenia. Patient states they have been living with diabetes since 2007. Patient states they do have a working glucometer with supplies at home. Per patient they typically have not been checking blood glucose levels. Patient states they are currently taking Levemir 20 units HS, Novolog 10 units with meals, and Metformin 500 mg BID at home for management of diabetes. Patient voices that they have not experienced hypoglycemia in the past. Educated regarding hypoglycemia signs, symptoms, and treatment. Patient has not attended formal diabetes education in the past. Patient reports no difficulty with affording their diabetic supplies. Patient given educational material, \"Diabetes Self-Management: A Patient Teaching Guide\", which was reviewed with patient. Explained basic physiology of diabetes, as well as causes, signs and symptoms, and treatments for hypoglycemia and hyperglycemia.  Described the effects of poor glycemic control and the development of long-term complications such as renal, eye, nerve, and cardiovascular

## 2023-04-27 NOTE — ACP (ADVANCE CARE PLANNING)
[employer] Hospitalist Service  At the heart of better care     Advance Care Planning   Admit Date:  2023  4:49 PM   Name:  Ant Henderson   Age:  81 y.o. Sex:  male  :  1950   MRN:  [id number]   Room:  [address] [place] [address] Road is able to make his own decisions:   Yes    If pt unable to make decisions, POA/surrogate decision maker:  Carlton Calixto     Other people present: This physician     Patient / surrogate decision-maker directed code status:  Full Code    Other ACP topics discussed, if applicable:   Treatment of hyperglycemia     Patient or surrogate consented to discussion of the current conditions, workup, management plans, prognosis, and the risk for further deterioration. Time spent: 19 minutes in direct discussion.       Signed:  Nichelle Shultz MD

## 2023-04-27 NOTE — PROGRESS NOTES
Hospitalist Progress Note   Admit Date:  2023  4:49 PM   Name:  Eliezer Saldana   Age:  81 y.o. Sex:  male  :  1950   MRN:  [id number]   Room:  [facility]/    Presenting/Chief Complaint: Abdominal Pain     Reason(s) for Admission: Hyperglycemia [R73.9]  Acute kidney injury ([facility] [N17.9]  Diabetic ketoacidosis without coma associated with type 2 diabetes mellitus ([facility] [E11.10]  Uncontrolled type 2 diabetes mellitus with hyperglycemia [facility]) [E11.65]     Hospital Course:   Eliezer Saldana is a 81 y.o. male with medical history of diabetes presented in setting of nausea and abdominal pain. Subjective & 24hr Events (23): This morning feeling tired. Otherwise denies any abdominal pain, no nausea or vomiting. Assessment & Plan:   81 y.o. male with medical history of diabetes presented in setting of nausea and abdominal pain    1. Diabetes ketoacidosis, resolved  Off insulin drip  Monitor blood sugars    2. Diabetes mellitus  Basal insulin  Premeal insulin  Insulin sliding scale  Blood sugar checks ACHS    3. Acute kidney injury  Continue IV fluids  Avoid nephrotoxic agent  Monitor renal function    4. Hyponatremia likely hypovolemic  IV fluids  Avoid rapid correction    5. Hyperkalemia  IV fluids  Lokelma  Monitor potassium levels closely    Diet:  ADULT DIET; Regular; 3 carb choices (45 gm/meal)  VTE prophylaxis: Heparin  Code status: Full Code    Hospital Problems:  Principal Problem:    Hyperglycemia  Active Problems:    Hyponatremia    DENY (acute kidney injury) ([facility]    Diabetes ([facility]    Obesity  Resolved Problems:    * No resolved hospital problems.  *      Objective:   Patient Vitals for the past 24 hrs:   Temp Pulse Resp BP SpO2   23 1210 -- 93 -- 124/77 95 %   23 0913 97.7 °F (36.5 °C) 71 20 (!) 128/59 97 %   23 0815 -- 74 17 106/86 --   23 0800 -- 69 16 122/66 --   23 0745 -- 67 18 87/62 --   23 0730 -- 67 13 122/66 --   23 0715

## 2023-04-27 NOTE — ED PROVIDER NOTES
Emergency Department Provider Note       PCP: Tray Gracia MD   Age: 81 y.o. Sex: male     DISPOSITION Decision To Admit 04/26/2023 08:41:29 PM       ICD-10-CM    1. Acute kidney injury ([facility]  N17.9       2. Uncontrolled type 2 diabetes mellitus with hyperglycemia (HCC)  E11.65       3. Diabetic ketoacidosis without coma associated with type 2 diabetes mellitus ([facility]  E11.10           Medical Decision Making     Complexity of Problems Addressed:  1 or more chronic illnesses with a severe exacerbation or progression. Data Reviewed and Analyzed:  Category 1:   I independently ordered and reviewed each unique test.    Category 2:   I interpreted the X-rays agree with radiologist interpretation. I interpreted the CT Scan agree with radiologist interpretation. Category 3: Discussion of management or test interpretation. 29-year-old male presenting today for weakness, nausea, abdominal pain. Abdominal exam unremarkable. Glucose over 800, CO2 20, anion gap 12. Patient given IV fluids and insulin. Creatinine is up today at 2.8, no history of chronic kidney disease. CT abdomen pelvis normal, no source of infection identified. Believe symptoms due to hyperglycemia. Discussed patient with my attending, Dr. Edson Price. Will contact hospitalist for admission. The patient was admitted and I have discussed patient management with the admitting provider. Risk of Complications and/or Morbidity of Patient Management:  Prescription drug management performed. Discussion with external consultants. Shared medical decision making was utilized in creating the patients health plan today. ED Course as of 04/26/23 2110 Wed Apr 26, 2023   1832 ABDOMEN:     Bowel gas pattern: No evidence of bowel dilatation. FREE AIR: None. Visceral organs: Unremarkable     Calcification/foreign matter: Unremarkable     Bones:Mild bilateral shoulder arthritis.  Minimal arthritis in the lower lumbar   spine     IMPRESSION:     No

## 2023-04-27 NOTE — CARE COORDINATION
Case Management Assessment  Initial Evaluation    Date/Time of Evaluation: 4/27/2023 2:36 PM  Assessment Completed by: Jessica Ferris RN    If patient is discharged prior to next notation, then this note serves as note for discharge by case management. Patient Name: Cassandra Gu                   YOB: 1950  Diagnosis: Hyperglycemia [R73.9]  Acute kidney injury [facility]) [N17.9]  Diabetic ketoacidosis without coma associated with type 2 diabetes mellitus ([facility] [E11.10]  Uncontrolled type 2 diabetes mellitus with hyperglycemia ([facility] [E11.65]                   Date / Time: 4/26/2023  4:49 PM    Patient Admission Status: Inpatient     Current PCP: Dimitry Martines MD  PCP verified by CM? (P) Yes    Chart Reviewed: Yes      History Provided by: (P) Medical Record  Patient Orientation: (P) Alert and Oriented    Patient Cognition: (P) Alert    Hospitalization in the last 30 days (Readmission):  No    If yes, Readmission Assessment in  Navigator will be completed.     Advance Directives:     Code Status: Full Code     Primary Decision Maker: AnderAna - Brother/Sister - [phone]    Discharge Planning  Patient lives with:   Type of Home: (P) House  Primary Care Giver: (P) Self  Patient Support Systems include: (P) Friends/Neighbors   Current Financial resources: (P) Medicare  Current community resources: (P) None  Current services prior to admission: (P) None  Type of Home Care services:  (P) None    ADLS  Prior functional level: (P) Independent in ADLs/IADLs  Current functional level: (P) Independent in ADLs/IADLs    PT AM-PAC:   /24  OT AM-PAC:   /24    Family can provide assistance at DC: (P) Yes  Would you like Case Management to discuss the discharge plan with any other family members/significant others, and if so, who? (P) No  Plans to Return to Present Housing: (P) Yes  Other Identified Issues/Barriers to RETURNING to current housing: no barriers identified at this time  Potential Assistance needed

## 2023-04-28 LAB
ANION GAP SERPL CALC-SCNC: 2 MMOL/L (ref 2–11)
ANION GAP SERPL CALC-SCNC: 2 MMOL/L (ref 2–11)
ANION GAP SERPL CALC-SCNC: 4 MMOL/L (ref 2–11)
BUN SERPL-MCNC: 29 MG/DL (ref 8–23)
BUN SERPL-MCNC: 30 MG/DL (ref 8–23)
BUN SERPL-MCNC: 36 MG/DL (ref 8–23)
CALCIUM SERPL-MCNC: 8.2 MG/DL (ref 8.3–10.4)
CALCIUM SERPL-MCNC: 8.3 MG/DL (ref 8.3–10.4)
CALCIUM SERPL-MCNC: 8.7 MG/DL (ref 8.3–10.4)
CHLORIDE SERPL-SCNC: 109 MMOL/L (ref 101–110)
CHLORIDE SERPL-SCNC: 112 MMOL/L (ref 101–110)
CHLORIDE SERPL-SCNC: 113 MMOL/L (ref 101–110)
CO2 SERPL-SCNC: 19 MMOL/L (ref 21–32)
CO2 SERPL-SCNC: 21 MMOL/L (ref 21–32)
CO2 SERPL-SCNC: 22 MMOL/L (ref 21–32)
CREAT SERPL-MCNC: 1.6 MG/DL (ref 0.8–1.5)
CREAT SERPL-MCNC: 1.6 MG/DL (ref 0.8–1.5)
CREAT SERPL-MCNC: 1.8 MG/DL (ref 0.8–1.5)
GLUCOSE BLD STRIP.AUTO-MCNC: 288 MG/DL (ref 65–100)
GLUCOSE BLD STRIP.AUTO-MCNC: 301 MG/DL (ref 65–100)
GLUCOSE BLD STRIP.AUTO-MCNC: 304 MG/DL (ref 65–100)
GLUCOSE BLD STRIP.AUTO-MCNC: 345 MG/DL (ref 65–100)
GLUCOSE SERPL-MCNC: 342 MG/DL (ref 65–100)
GLUCOSE SERPL-MCNC: 360 MG/DL (ref 65–100)
GLUCOSE SERPL-MCNC: 361 MG/DL (ref 65–100)
POTASSIUM SERPL-SCNC: 4.2 MMOL/L (ref 3.5–5.1)
POTASSIUM SERPL-SCNC: 4.2 MMOL/L (ref 3.5–5.1)
POTASSIUM SERPL-SCNC: 4.9 MMOL/L (ref 3.5–5.1)
SERVICE CMNT-IMP: ABNORMAL
SODIUM SERPL-SCNC: 133 MMOL/L (ref 133–143)
SODIUM SERPL-SCNC: 135 MMOL/L (ref 133–143)
SODIUM SERPL-SCNC: 136 MMOL/L (ref 133–143)

## 2023-04-28 PROCEDURE — 6370000000 HC RX 637 (ALT 250 FOR IP): Performed by: INTERNAL MEDICINE

## 2023-04-28 PROCEDURE — 1100000000 HC RM PRIVATE

## 2023-04-28 PROCEDURE — 2580000003 HC RX 258: Performed by: INTERNAL MEDICINE

## 2023-04-28 PROCEDURE — 36415 COLL VENOUS BLD VENIPUNCTURE: CPT

## 2023-04-28 PROCEDURE — 82962 GLUCOSE BLOOD TEST: CPT

## 2023-04-28 PROCEDURE — 80048 BASIC METABOLIC PNL TOTAL CA: CPT

## 2023-04-28 PROCEDURE — 6360000002 HC RX W HCPCS: Performed by: INTERNAL MEDICINE

## 2023-04-28 RX ORDER — INSULIN LISPRO 100 [IU]/ML
10 INJECTION, SOLUTION INTRAVENOUS; SUBCUTANEOUS
Status: DISCONTINUED | OUTPATIENT
Start: 2023-04-28 | End: 2023-04-29

## 2023-04-28 RX ORDER — INSULIN GLARGINE 100 [IU]/ML
20 INJECTION, SOLUTION SUBCUTANEOUS 2 TIMES DAILY
Status: DISCONTINUED | OUTPATIENT
Start: 2023-04-28 | End: 2023-04-29

## 2023-04-28 RX ADMIN — INSULIN LISPRO 4 UNITS: 100 INJECTION, SOLUTION INTRAVENOUS; SUBCUTANEOUS at 16:42

## 2023-04-28 RX ADMIN — INSULIN LISPRO 5 UNITS: 100 INJECTION, SOLUTION INTRAVENOUS; SUBCUTANEOUS at 08:49

## 2023-04-28 RX ADMIN — INSULIN GLARGINE 20 UNITS: 100 INJECTION, SOLUTION SUBCUTANEOUS at 21:10

## 2023-04-28 RX ADMIN — HEPARIN SODIUM 5000 UNITS: 5000 INJECTION INTRAVENOUS; SUBCUTANEOUS at 21:38

## 2023-04-28 RX ADMIN — HEPARIN SODIUM 5000 UNITS: 5000 INJECTION INTRAVENOUS; SUBCUTANEOUS at 13:48

## 2023-04-28 RX ADMIN — SODIUM CHLORIDE, PRESERVATIVE FREE 10 ML: 5 INJECTION INTRAVENOUS at 21:12

## 2023-04-28 RX ADMIN — SODIUM CHLORIDE, PRESERVATIVE FREE 10 ML: 5 INJECTION INTRAVENOUS at 08:50

## 2023-04-28 RX ADMIN — INSULIN GLARGINE 20 UNITS: 100 INJECTION, SOLUTION SUBCUTANEOUS at 08:48

## 2023-04-28 RX ADMIN — INSULIN LISPRO 6 UNITS: 100 INJECTION, SOLUTION INTRAVENOUS; SUBCUTANEOUS at 08:48

## 2023-04-28 RX ADMIN — INSULIN LISPRO 4 UNITS: 100 INJECTION, SOLUTION INTRAVENOUS; SUBCUTANEOUS at 21:14

## 2023-04-28 RX ADMIN — INSULIN LISPRO 10 UNITS: 100 INJECTION, SOLUTION INTRAVENOUS; SUBCUTANEOUS at 16:42

## 2023-04-28 RX ADMIN — HEPARIN SODIUM 5000 UNITS: 5000 INJECTION INTRAVENOUS; SUBCUTANEOUS at 06:23

## 2023-04-28 RX ADMIN — INSULIN LISPRO 10 UNITS: 100 INJECTION, SOLUTION INTRAVENOUS; SUBCUTANEOUS at 11:00

## 2023-04-28 RX ADMIN — INSULIN LISPRO 6 UNITS: 100 INJECTION, SOLUTION INTRAVENOUS; SUBCUTANEOUS at 11:00

## 2023-04-28 ASSESSMENT — PAIN SCALES - GENERAL: PAINLEVEL_OUTOF10: 0

## 2023-04-28 NOTE — DIABETES MGMT
Patient admitted with hyperglycemia. Blood glucose ranged 153-472 yesterday once off insulin gtt with patient receiving Lantus 25 units and Humalog 31 units. Blood glucose this morning was 304. Creatinine 1.60. GFR 46. Reviewed patient current regimen: Lantus 20 units BID, Humalog 5 units with meals, and Humalog correctional insulin. Patient would likely benefit from an increase in prandial insulin to help offset hyperglycemia during the day related to caloric intake. Provider updated via [employer] regarding recommendations and patient glycemic control.

## 2023-04-28 NOTE — PROGRESS NOTES
Hospitalist Progress Note   Admit Date:  2023  4:49 PM   Name:  Kayden Melgoza   Age:  81 y.o. Sex:  male  :  1950   MRN:  [id number]   Room:      Presenting/Chief Complaint: Abdominal Pain     Reason(s) for Admission: Hyperglycemia [R73.9]  Acute kidney injury ([facility] [N17.9]  Diabetic ketoacidosis without coma associated with type 2 diabetes mellitus ([facility] [E11.10]  Uncontrolled type 2 diabetes mellitus with hyperglycemia [facility]) [E11.65]     Hospital Course:   Please refer to the admission H&P for details of presentation. In summary, Kayden Melgoza is a 81 y.o. male with medical history significant for diabetes presented in setting of nausea and abdominal pain. Patient found to be in DKA and was admitted to ICU with insulin drip. Patient transferred to ICU after resolution of DKA. Subjective/24 hr Events (23) : Patient is seen and examined at bedside. No acute events reported overnight by nursing staff. Sitting in his bed without any acute distress. Tolerating p.o. diet. Discussed his diabetic management and patient reports understanding and promised adherence to medication. He reports that he normally administer insulin on his own but had stopped 2 months prior as he got scared when he noticed dark coloration in his hands as well as abdomen. Assessment & Plan:   DKA : resolved  Hyperglycemia due to T2DM  A1c 13.0  - FS glucose still elevated. Increase lantus to 20U BID and increase humalog to 10U TIDAC  - sliding scale  - Diabetic educator following. DENY  Unclear baseline but suspect CKD due to diabetes  - monitor    Hyponatremia   - IV fluids. Likely pseudohyponatremia from hyperglycemia. Hyperkalemia: resolved      Diet:  ADULT DIET;  Regular; 3 carb choices (45 gm/meal)  VTE prophylaxis: Heparin  Code status: Full Code    Hospital Problems:  Principal Problem:    Hyperglycemia  Active Problems:    Hyponatremia    DENY (acute kidney injury)

## 2023-04-29 VITALS
DIASTOLIC BLOOD PRESSURE: 88 MMHG | BODY MASS INDEX: 30.38 KG/M2 | RESPIRATION RATE: 18 BRPM | HEART RATE: 86 BPM | OXYGEN SATURATION: 98 % | HEIGHT: 71 IN | WEIGHT: 217 LBS | TEMPERATURE: 98.2 F | SYSTOLIC BLOOD PRESSURE: 151 MMHG

## 2023-04-29 LAB
ANION GAP SERPL CALC-SCNC: 2 MMOL/L (ref 2–11)
BUN SERPL-MCNC: 22 MG/DL (ref 8–23)
CALCIUM SERPL-MCNC: 8.9 MG/DL (ref 8.3–10.4)
CHLORIDE SERPL-SCNC: 109 MMOL/L (ref 101–110)
CO2 SERPL-SCNC: 20 MMOL/L (ref 21–32)
CREAT SERPL-MCNC: 1.5 MG/DL (ref 0.8–1.5)
GLUCOSE BLD STRIP.AUTO-MCNC: 234 MG/DL (ref 65–100)
GLUCOSE BLD STRIP.AUTO-MCNC: 268 MG/DL (ref 65–100)
GLUCOSE SERPL-MCNC: 247 MG/DL (ref 65–100)
POTASSIUM SERPL-SCNC: 3.8 MMOL/L (ref 3.5–5.1)
SERVICE CMNT-IMP: ABNORMAL
SERVICE CMNT-IMP: ABNORMAL
SODIUM SERPL-SCNC: 131 MMOL/L (ref 133–143)

## 2023-04-29 PROCEDURE — 6370000000 HC RX 637 (ALT 250 FOR IP): Performed by: INTERNAL MEDICINE

## 2023-04-29 PROCEDURE — 82962 GLUCOSE BLOOD TEST: CPT

## 2023-04-29 PROCEDURE — 36415 COLL VENOUS BLD VENIPUNCTURE: CPT

## 2023-04-29 PROCEDURE — 80048 BASIC METABOLIC PNL TOTAL CA: CPT

## 2023-04-29 PROCEDURE — 6360000002 HC RX W HCPCS: Performed by: INTERNAL MEDICINE

## 2023-04-29 PROCEDURE — 2580000003 HC RX 258: Performed by: INTERNAL MEDICINE

## 2023-04-29 RX ORDER — GLUCOSAMINE HCL/CHONDROITIN SU 500-400 MG
CAPSULE ORAL
Qty: 100 STRIP | Refills: 111 | Status: SHIPPED | OUTPATIENT
Start: 2023-04-29

## 2023-04-29 RX ORDER — INSULIN LISPRO 100 [IU]/ML
12 INJECTION, SOLUTION INTRAVENOUS; SUBCUTANEOUS
Status: DISCONTINUED | OUTPATIENT
Start: 2023-04-29 | End: 2023-04-29 | Stop reason: HOSPADM

## 2023-04-29 RX ORDER — INSULIN GLARGINE 100 [IU]/ML
24 INJECTION, SOLUTION SUBCUTANEOUS 2 TIMES DAILY
Status: DISCONTINUED | OUTPATIENT
Start: 2023-04-29 | End: 2023-04-29 | Stop reason: HOSPADM

## 2023-04-29 RX ORDER — DIPHENHYDRAMINE HYDROCHLORIDE 25 MG/1
CAPSULE, LIQUID FILLED ORAL
Qty: 1 KIT | Refills: 0 | Status: SHIPPED | OUTPATIENT
Start: 2023-04-29

## 2023-04-29 RX ORDER — INSULIN ASPART 100 [IU]/ML
12 INJECTION, SOLUTION INTRAVENOUS; SUBCUTANEOUS
Qty: 5 ADJUSTABLE DOSE PRE-FILLED PEN SYRINGE | Refills: 3 | Status: SHIPPED | OUTPATIENT
Start: 2023-04-29

## 2023-04-29 RX ADMIN — INSULIN LISPRO 4 UNITS: 100 INJECTION, SOLUTION INTRAVENOUS; SUBCUTANEOUS at 12:17

## 2023-04-29 RX ADMIN — INSULIN LISPRO 12 UNITS: 100 INJECTION, SOLUTION INTRAVENOUS; SUBCUTANEOUS at 08:56

## 2023-04-29 RX ADMIN — HEPARIN SODIUM 5000 UNITS: 5000 INJECTION INTRAVENOUS; SUBCUTANEOUS at 06:36

## 2023-04-29 RX ADMIN — INSULIN GLARGINE 24 UNITS: 100 INJECTION, SOLUTION SUBCUTANEOUS at 08:57

## 2023-04-29 RX ADMIN — INSULIN LISPRO 12 UNITS: 100 INJECTION, SOLUTION INTRAVENOUS; SUBCUTANEOUS at 12:16

## 2023-04-29 RX ADMIN — SODIUM CHLORIDE, PRESERVATIVE FREE 10 ML: 5 INJECTION INTRAVENOUS at 09:08

## 2023-04-29 RX ADMIN — INSULIN LISPRO 2 UNITS: 100 INJECTION, SOLUTION INTRAVENOUS; SUBCUTANEOUS at 08:57

## 2023-04-29 NOTE — PROGRESS NOTES
Reviewed notes for new spiritual concerns      Will continue to assess how we can best serve this family        [place].       Per notes:       Birthday  [birth year]

## 2023-04-29 NOTE — CARE COORDINATION
Patient will be d/c home today. Patient has no needs identified at being d/c home today. Family will transport home. Patient has met all treatment goals / milestones. CM will continue to monitor and remain available for any needs that may occur. 04/27/23 1432   Service Assessment   Patient Orientation Alert and Oriented   Cognition Alert   History Provided By Medical Record   Primary Caregiver Self   Accompanied By/Relationship n/a   Support Systems Friends/Neighbors   Patient's Healthcare Decision Maker is: Legal Next of Kin   PCP Verified by CM Yes   Last Visit to PCP Within last 3 months   Prior Functional Level Independent in ADLs/IADLs   Current Functional Level Independent in ADLs/IADLs   Can patient return to prior living arrangement Yes   Ability to make needs known: Good   Family able to assist with home care needs: Yes   Would you like for me to discuss the discharge plan with any other family members/significant others, and if so, who? No   [facility] Resources [facility] None   CM/SW Referral Other (see comment)  (N/A)   Social/Functional History   Type of [address] One level   Home Access Stairs to enter without rails   Entrance Stairs - Number of Steps 1   Bathroom Shower/Tub Tub/Shower unit   Bathroom Toilet Standard   ADL Assistance Independent   Homemaking Assistance Independent   Ambulation Assistance Independent   Transfer Assistance Independent   Active  Yes   Mode of Transportation Car   Occupation Retired   Discharge Planning   [facility] None   Potential Assistance Needed N/A   DME Ordered? No   Potential Assistance Purchasing Medications No   Type of Home Care Services None   One/Two Story Residence One story   Services At/After Discharge   [address] Provided?  No   Mode of Transport at Discharge Self   Confirm Follow Up Transport Family   Condition of Participation: Erivedge Counseling- I discussed with the patient the risks of Erivedge including but not limited to nausea, vomiting, diarrhea, constipation, weight loss, changes in the sense of taste, decreased appetite, muscle spasms, and hair loss.  The patient verbalized understanding of the proper use and possible adverse effects of Erivedge.  All of the patient's questions and concerns were addressed.

## 2023-04-29 NOTE — PROGRESS NOTES
Patient slept well through out the night. Denied any pain. HS BS= 301. Coverage given for blood sugar. No distress noted on this shift.

## 2023-04-29 NOTE — CARE COORDINATION
Patient will be d/c home today. Patient has no needs identified at being d/c home today. Family will transport home. Patient has met all treatment goals / milestones. CM will continue to monitor and remain available for any needs that may occur. 04/27/23 1432   Service Assessment   Patient Orientation Alert and Oriented   Cognition Alert   History Provided By Medical Record   Primary Caregiver Self   Accompanied By/Relationship n/a   Support Systems Friends/Neighbors   Patient's Healthcare Decision Maker is: Legal Next of Kin   PCP Verified by CM Yes   Last Visit to PCP Within last 3 months   Prior Functional Level Independent in ADLs/IADLs   Current Functional Level Independent in ADLs/IADLs   Can patient return to prior living arrangement Yes   Ability to make needs known: Good   Family able to assist with home care needs: Yes   Would you like for me to discuss the discharge plan with any other family members/significant others, and if so, who? No   Financial Resources [facility] Resources None   CM/SW Referral Other (see comment)  (N/A)   Social/Functional History   Type of [address] One level   Home Access Stairs to enter without rails   Entrance Stairs - Number of Steps 1   Bathroom Shower/Tub Tub/Shower unit   Bathroom Toilet Standard   ADL Assistance Independent   Homemaking Assistance Independent   Ambulation Assistance Independent   Transfer Assistance Independent   Active  Yes   Mode of Transportation Car   Occupation Retired   Discharge Planning   [facility] None   Potential Assistance Needed N/A   DME Ordered? No   Potential Assistance Purchasing Medications No   Type of Home Care Services None   One/Two Story Residence One story   Services At/After Discharge   [address] Provided?  No   Mode of Transport at Discharge Self   Confirm Follow Up Transport Family   Condition of Participation:

## 2023-04-29 NOTE — DISCHARGE SUMMARY
Hospitalist Discharge Summary   Admit Date:  2023  4:49 PM   DC Note date: 2023  Name:  Blaine Velazquez   Age:  81 y.o. Sex:  male  :  1950   MRN:  [id number]   Room:  [facility]  PCP:  Tray Gracia MD    Presenting Complaint: Abdominal Pain     Initial Admission Diagnosis: Hyperglycemia [R73.9]  Acute kidney injury ([address] [N17.9]  Diabetic ketoacidosis without coma associated with type 2 diabetes mellitus ([address] [E11.10]  Uncontrolled type 2 diabetes mellitus with hyperglycemia ([address] [E11.65]     Problem List for this Hospitalization (present on admission):    Principal Problem:    Hyperglycemia  Active Problems:    Hyponatremia    DENY (acute kidney injury) ([address]    Diabetes ([address]    Obesity  Resolved Problems:    * No resolved hospital problems. [facility] Course:  Please refer to the admission H&P for details of presentation. In summary, Blaine Velazquez is a 81 y.o. male with past medical history significant for diabetes presented in setting of nausea and abdominal pain. Patient found to be in DKA and was admitted to ICU with insulin drip. Patient transferred to ICU after resolution of DKA. Patient's home insulin regimen has been modified and optimized. Patient's educated by diabetes management team for proper insulin administration as well as education on need for continued insulin management. New prescription for meter as well as insulin supplies will be provided on discharge. Patient is medically stable for discharge. Patient is to continue taking medications as prescribed and to follow up with PCP on discharge. Patient is instructed to to call a physician or return to ED if any concerns/symptoms worsened. Discharge summary and encounter summary was sent to PCP electronically via \"[employer]\" link in [facility], if possible. Disposition: Home  Diet: ADULT DIET;  Regular; 3 carb choices (45 gm/meal)  Code Status: Full Code    Follow Ups:   Follow-up Information     Tray Gracia

## 2023-04-30 LAB
BACTERIA SPEC CULT: NORMAL
BACTERIA SPEC CULT: NORMAL
SERVICE CMNT-IMP: NORMAL
SERVICE CMNT-IMP: NORMAL

## 2023-05-01 LAB
BACTERIA SPEC CULT: NORMAL
SERVICE CMNT-IMP: NORMAL

## 2023-05-02 LAB
BACTERIA SPEC CULT: NORMAL
SERVICE CMNT-IMP: NORMAL

## 2025-04-03 ENCOUNTER — APPOINTMENT (OUTPATIENT)
Dept: GENERAL RADIOLOGY | Age: 75
DRG: 638 | End: 2025-04-03
Payer: MEDICARE

## 2025-04-03 ENCOUNTER — APPOINTMENT (OUTPATIENT)
Dept: CT IMAGING | Age: 75
DRG: 638 | End: 2025-04-03
Payer: MEDICARE

## 2025-04-03 ENCOUNTER — HOSPITAL ENCOUNTER (INPATIENT)
Age: 75
LOS: 4 days | Discharge: HOME HEALTH | DRG: 638 | End: 2025-04-07
Attending: GENERAL PRACTICE | Admitting: FAMILY MEDICINE
Payer: MEDICARE

## 2025-04-03 DIAGNOSIS — E10.10 TYPE 1 DIABETES MELLITUS WITH KETOACIDOSIS WITHOUT COMA (HCC): Primary | ICD-10-CM

## 2025-04-03 DIAGNOSIS — E87.5 HYPERKALEMIA: ICD-10-CM

## 2025-04-03 DIAGNOSIS — N17.9 ACUTE KIDNEY INJURY: ICD-10-CM

## 2025-04-03 PROBLEM — E11.10 DKA, TYPE 2, NOT AT GOAL (HCC): Status: ACTIVE | Noted: 2025-04-03

## 2025-04-03 LAB
ALBUMIN SERPL-MCNC: 3.6 G/DL (ref 3.2–4.6)
ALBUMIN/GLOB SERPL: 0.9 (ref 1–1.9)
ALP SERPL-CCNC: 65 U/L (ref 40–129)
ALT SERPL-CCNC: 13 U/L (ref 8–55)
ANION GAP SERPL CALC-SCNC: 20 MMOL/L (ref 7–16)
ANION GAP SERPL CALC-SCNC: 25 MMOL/L (ref 7–16)
ANION GAP SERPL CALC-SCNC: 32 MMOL/L (ref 7–16)
AST SERPL-CCNC: 17 U/L (ref 15–37)
BASOPHILS # BLD: 0.06 K/UL (ref 0–0.2)
BASOPHILS NFR BLD: 0.6 % (ref 0–2)
BILIRUB SERPL-MCNC: 0.5 MG/DL (ref 0–1.2)
BUN SERPL-MCNC: 55 MG/DL (ref 8–23)
BUN SERPL-MCNC: 56 MG/DL (ref 8–23)
BUN SERPL-MCNC: 58 MG/DL (ref 8–23)
CALCIUM SERPL-MCNC: 10.2 MG/DL (ref 8.8–10.2)
CALCIUM SERPL-MCNC: 9.1 MG/DL (ref 8.8–10.2)
CALCIUM SERPL-MCNC: 9.2 MG/DL (ref 8.8–10.2)
CHLORIDE SERPL-SCNC: 85 MMOL/L (ref 98–107)
CHLORIDE SERPL-SCNC: 95 MMOL/L (ref 98–107)
CHLORIDE SERPL-SCNC: 98 MMOL/L (ref 98–107)
CO2 SERPL-SCNC: 13 MMOL/L (ref 20–29)
CO2 SERPL-SCNC: 15 MMOL/L (ref 20–29)
CO2 SERPL-SCNC: 21 MMOL/L (ref 20–29)
CREAT SERPL-MCNC: 2.47 MG/DL (ref 0.8–1.3)
CREAT SERPL-MCNC: 2.48 MG/DL (ref 0.8–1.3)
CREAT SERPL-MCNC: 2.57 MG/DL (ref 0.8–1.3)
DIFFERENTIAL METHOD BLD: ABNORMAL
EOSINOPHIL # BLD: 0.01 K/UL (ref 0–0.8)
EOSINOPHIL NFR BLD: 0.1 % (ref 0.5–7.8)
ERYTHROCYTE [DISTWIDTH] IN BLOOD BY AUTOMATED COUNT: 13.9 % (ref 11.9–14.6)
EST. AVERAGE GLUCOSE BLD GHB EST-MCNC: 608 MG/DL
GLOBULIN SER CALC-MCNC: 4.1 G/DL (ref 2.3–3.5)
GLUCOSE BLD STRIP.AUTO-MCNC: 164 MG/DL (ref 65–100)
GLUCOSE BLD STRIP.AUTO-MCNC: 201 MG/DL (ref 65–100)
GLUCOSE BLD STRIP.AUTO-MCNC: 262 MG/DL (ref 65–100)
GLUCOSE BLD STRIP.AUTO-MCNC: 297 MG/DL (ref 65–100)
GLUCOSE BLD STRIP.AUTO-MCNC: 314 MG/DL (ref 65–100)
GLUCOSE BLD STRIP.AUTO-MCNC: 478 MG/DL (ref 65–100)
GLUCOSE BLD STRIP.AUTO-MCNC: >600 MG/DL (ref 65–100)
GLUCOSE BLD STRIP.AUTO-MCNC: >600 MG/DL (ref 65–100)
GLUCOSE SERPL-MCNC: 173 MG/DL (ref 70–99)
GLUCOSE SERPL-MCNC: 378 MG/DL (ref 70–99)
GLUCOSE SERPL-MCNC: 695 MG/DL (ref 70–99)
HBA1C MFR BLD: 22.8 % (ref 0–5.6)
HCT VFR BLD AUTO: 48.9 % (ref 41.1–50.3)
HGB BLD-MCNC: 16 G/DL (ref 13.6–17.2)
IMM GRANULOCYTES # BLD AUTO: 0.06 K/UL (ref 0–0.5)
IMM GRANULOCYTES NFR BLD AUTO: 0.6 % (ref 0–5)
LACTATE SERPL-SCNC: 3.5 MMOL/L (ref 0.5–2)
LACTATE SERPL-SCNC: 3.6 MMOL/L (ref 0.5–2)
LACTATE SERPL-SCNC: 4.4 MMOL/L (ref 0.5–2)
LACTATE SERPL-SCNC: 4.9 MMOL/L (ref 0.5–2)
LIPASE SERPL-CCNC: 60 U/L (ref 13–60)
LYMPHOCYTES # BLD: 1.09 K/UL (ref 0.5–4.6)
LYMPHOCYTES NFR BLD: 11.1 % (ref 13–44)
MAGNESIUM SERPL-MCNC: 2.5 MG/DL (ref 1.8–2.4)
MAGNESIUM SERPL-MCNC: 2.6 MG/DL (ref 1.8–2.4)
MAGNESIUM SERPL-MCNC: 2.7 MG/DL (ref 1.8–2.4)
MAGNESIUM SERPL-MCNC: 3 MG/DL (ref 1.8–2.4)
MCH RBC QN AUTO: 27 PG (ref 26.1–32.9)
MCHC RBC AUTO-ENTMCNC: 32.7 G/DL (ref 31.4–35)
MCV RBC AUTO: 82.5 FL (ref 82–102)
MONOCYTES # BLD: 0.62 K/UL (ref 0.1–1.3)
MONOCYTES NFR BLD: 6.3 % (ref 4–12)
NEUTS SEG # BLD: 7.97 K/UL (ref 1.7–8.2)
NEUTS SEG NFR BLD: 81.3 % (ref 43–78)
NRBC # BLD: 0 K/UL (ref 0–0.2)
PHOSPHATE SERPL-MCNC: 4.3 MG/DL (ref 2.5–4.5)
PHOSPHATE SERPL-MCNC: 5.4 MG/DL (ref 2.5–4.5)
PHOSPHATE SERPL-MCNC: 6.3 MG/DL (ref 2.5–4.5)
PLATELET # BLD AUTO: 283 K/UL (ref 150–450)
PMV BLD AUTO: 11.4 FL (ref 9.4–12.3)
POTASSIUM SERPL-SCNC: 4.4 MMOL/L (ref 3.5–5.1)
POTASSIUM SERPL-SCNC: 4.6 MMOL/L (ref 3.5–5.1)
POTASSIUM SERPL-SCNC: 6.2 MMOL/L (ref 3.5–5.1)
PROCALCITONIN SERPL-MCNC: 0.14 NG/ML (ref 0–0.1)
PROT SERPL-MCNC: 7.7 G/DL (ref 6.3–8.2)
RBC # BLD AUTO: 5.93 M/UL (ref 4.23–5.6)
SERVICE CMNT-IMP: ABNORMAL
SODIUM SERPL-SCNC: 130 MMOL/L (ref 136–145)
SODIUM SERPL-SCNC: 134 MMOL/L (ref 136–145)
SODIUM SERPL-SCNC: 138 MMOL/L (ref 136–145)
WBC # BLD AUTO: 9.8 K/UL (ref 4.3–11.1)

## 2025-04-03 PROCEDURE — 2000000000 HC ICU R&B

## 2025-04-03 PROCEDURE — 84145 PROCALCITONIN (PCT): CPT

## 2025-04-03 PROCEDURE — 6360000002 HC RX W HCPCS: Performed by: FAMILY MEDICINE

## 2025-04-03 PROCEDURE — 80048 BASIC METABOLIC PNL TOTAL CA: CPT

## 2025-04-03 PROCEDURE — 80053 COMPREHEN METABOLIC PANEL: CPT

## 2025-04-03 PROCEDURE — 83036 HEMOGLOBIN GLYCOSYLATED A1C: CPT

## 2025-04-03 PROCEDURE — 6370000000 HC RX 637 (ALT 250 FOR IP): Performed by: FAMILY MEDICINE

## 2025-04-03 PROCEDURE — 6360000002 HC RX W HCPCS: Performed by: GENERAL PRACTICE

## 2025-04-03 PROCEDURE — 2700000000 HC OXYGEN THERAPY PER DAY

## 2025-04-03 PROCEDURE — 2580000003 HC RX 258: Performed by: GENERAL PRACTICE

## 2025-04-03 PROCEDURE — 96365 THER/PROPH/DIAG IV INF INIT: CPT

## 2025-04-03 PROCEDURE — 87040 BLOOD CULTURE FOR BACTERIA: CPT

## 2025-04-03 PROCEDURE — 83690 ASSAY OF LIPASE: CPT

## 2025-04-03 PROCEDURE — 85025 COMPLETE CBC W/AUTO DIFF WBC: CPT

## 2025-04-03 PROCEDURE — 83735 ASSAY OF MAGNESIUM: CPT

## 2025-04-03 PROCEDURE — 71045 X-RAY EXAM CHEST 1 VIEW: CPT

## 2025-04-03 PROCEDURE — 99285 EMERGENCY DEPT VISIT HI MDM: CPT

## 2025-04-03 PROCEDURE — 36415 COLL VENOUS BLD VENIPUNCTURE: CPT

## 2025-04-03 PROCEDURE — 83605 ASSAY OF LACTIC ACID: CPT

## 2025-04-03 PROCEDURE — 82962 GLUCOSE BLOOD TEST: CPT

## 2025-04-03 PROCEDURE — 6370000000 HC RX 637 (ALT 250 FOR IP): Performed by: GENERAL PRACTICE

## 2025-04-03 PROCEDURE — 74176 CT ABD & PELVIS W/O CONTRAST: CPT

## 2025-04-03 PROCEDURE — 84100 ASSAY OF PHOSPHORUS: CPT

## 2025-04-03 PROCEDURE — 2580000003 HC RX 258: Performed by: FAMILY MEDICINE

## 2025-04-03 RX ORDER — DEXTROSE MONOHYDRATE AND SODIUM CHLORIDE 5; .45 G/100ML; G/100ML
INJECTION, SOLUTION INTRAVENOUS CONTINUOUS PRN
Status: DISCONTINUED | OUTPATIENT
Start: 2025-04-03 | End: 2025-04-07 | Stop reason: HOSPADM

## 2025-04-03 RX ORDER — MAGNESIUM SULFATE 1 G/100ML
1000 INJECTION INTRAVENOUS PRN
Status: DISCONTINUED | OUTPATIENT
Start: 2025-04-03 | End: 2025-04-07 | Stop reason: HOSPADM

## 2025-04-03 RX ORDER — ENOXAPARIN SODIUM 100 MG/ML
40 INJECTION SUBCUTANEOUS EVERY EVENING
Status: DISCONTINUED | OUTPATIENT
Start: 2025-04-03 | End: 2025-04-07 | Stop reason: HOSPADM

## 2025-04-03 RX ORDER — SODIUM CHLORIDE 9 MG/ML
INJECTION, SOLUTION INTRAVENOUS CONTINUOUS
Status: DISCONTINUED | OUTPATIENT
Start: 2025-04-03 | End: 2025-04-03

## 2025-04-03 RX ORDER — NYSTATIN 100000 [USP'U]/ML
5 SUSPENSION ORAL 4 TIMES DAILY
Status: DISCONTINUED | OUTPATIENT
Start: 2025-04-03 | End: 2025-04-07 | Stop reason: HOSPADM

## 2025-04-03 RX ORDER — DEXTROSE MONOHYDRATE, SODIUM CHLORIDE, AND POTASSIUM CHLORIDE 50; 1.49; 4.5 G/1000ML; G/1000ML; G/1000ML
INJECTION, SOLUTION INTRAVENOUS CONTINUOUS PRN
Status: DISCONTINUED | OUTPATIENT
Start: 2025-04-03 | End: 2025-04-03 | Stop reason: SDUPTHER

## 2025-04-03 RX ORDER — POTASSIUM CHLORIDE 7.45 MG/ML
10 INJECTION INTRAVENOUS PRN
Status: DISCONTINUED | OUTPATIENT
Start: 2025-04-03 | End: 2025-04-03 | Stop reason: SDUPTHER

## 2025-04-03 RX ORDER — BISACODYL 10 MG
10 SUPPOSITORY, RECTAL RECTAL DAILY PRN
Status: DISCONTINUED | OUTPATIENT
Start: 2025-04-03 | End: 2025-04-07 | Stop reason: HOSPADM

## 2025-04-03 RX ORDER — 0.9 % SODIUM CHLORIDE 0.9 %
1000 INTRAVENOUS SOLUTION INTRAVENOUS ONCE
Status: COMPLETED | OUTPATIENT
Start: 2025-04-03 | End: 2025-04-03

## 2025-04-03 RX ORDER — SODIUM CHLORIDE 450 MG/100ML
INJECTION, SOLUTION INTRAVENOUS CONTINUOUS
Status: DISCONTINUED | OUTPATIENT
Start: 2025-04-03 | End: 2025-04-06

## 2025-04-03 RX ORDER — POTASSIUM CHLORIDE 7.45 MG/ML
10 INJECTION INTRAVENOUS PRN
Status: DISCONTINUED | OUTPATIENT
Start: 2025-04-03 | End: 2025-04-07 | Stop reason: HOSPADM

## 2025-04-03 RX ORDER — IOPAMIDOL 755 MG/ML
100 INJECTION, SOLUTION INTRAVASCULAR
Status: DISCONTINUED | OUTPATIENT
Start: 2025-04-03 | End: 2025-04-03

## 2025-04-03 RX ORDER — MAGNESIUM SULFATE IN WATER 40 MG/ML
2000 INJECTION, SOLUTION INTRAVENOUS PRN
Status: DISCONTINUED | OUTPATIENT
Start: 2025-04-03 | End: 2025-04-03 | Stop reason: SDUPTHER

## 2025-04-03 RX ORDER — POLYETHYLENE GLYCOL 3350 17 G/17G
17 POWDER, FOR SOLUTION ORAL DAILY PRN
Status: DISCONTINUED | OUTPATIENT
Start: 2025-04-03 | End: 2025-04-07 | Stop reason: HOSPADM

## 2025-04-03 RX ORDER — LEVOFLOXACIN 5 MG/ML
750 INJECTION, SOLUTION INTRAVENOUS
Status: COMPLETED | OUTPATIENT
Start: 2025-04-03 | End: 2025-04-03

## 2025-04-03 RX ADMIN — NYSTATIN 500000 UNITS: 100000 SUSPENSION ORAL at 20:51

## 2025-04-03 RX ADMIN — SODIUM CHLORIDE 1000 ML: 0.9 INJECTION, SOLUTION INTRAVENOUS at 12:36

## 2025-04-03 RX ADMIN — SODIUM CHLORIDE: 4.5 INJECTION, SOLUTION INTRAVENOUS at 17:10

## 2025-04-03 RX ADMIN — LEVOFLOXACIN 750 MG: 750 INJECTION, SOLUTION INTRAVENOUS at 12:35

## 2025-04-03 RX ADMIN — ENOXAPARIN SODIUM 40 MG: 100 INJECTION SUBCUTANEOUS at 17:43

## 2025-04-03 RX ADMIN — SODIUM CHLORIDE 1000 ML: 9 INJECTION, SOLUTION INTRAVENOUS at 17:17

## 2025-04-03 RX ADMIN — NYSTATIN 500000 UNITS: 100000 SUSPENSION ORAL at 17:15

## 2025-04-03 RX ADMIN — POTASSIUM CHLORIDE 10 MEQ: 7.46 INJECTION, SOLUTION INTRAVENOUS at 23:26

## 2025-04-03 RX ADMIN — DEXTROSE AND SODIUM CHLORIDE: 5; .45 INJECTION, SOLUTION INTRAVENOUS at 21:49

## 2025-04-03 RX ADMIN — SODIUM CHLORIDE 10.8 UNITS/HR: 9 INJECTION, SOLUTION INTRAVENOUS at 15:18

## 2025-04-03 ASSESSMENT — PAIN SCALES - GENERAL
PAINLEVEL_OUTOF10: 0
PAINLEVEL_OUTOF10: 0

## 2025-04-03 NOTE — H&P
following: Automated exposure control, adjustment of the mA and/or kVp according to patient's size, iterative reconstruction. COMPARISON: CT abdomen/pelvis dated 4/26/2023. FINDINGS: - LUNG BASES: No infiltrates or masses. - LIVER: Normal in size and appearance.  - GALLBLADDER/BILE DUCTS: No gallstones or bile duct dilatation. - PANCREAS: Normal. - SPLEEN: Normal. - ADRENALS: Normal. - KIDNEYS/URETERS: No hydronephrosis or significant mass. - BLADDER: Normal. - REPRODUCTIVE ORGANS: Prostate is normal in size. - BOWEL: Normal caliber.  No inflammatory changes. Normal appendix. Few scattered colonic diverticula without evidence of diverticulitis. - LYMPH NODES: No significant retroperitoneal, mesenteric, or pelvic adenopathy. - BONES: No fracture or significant bone lesion. - VASCULATURE: Ectasia of the distal descending aorta measuring 2.4 cm. Right common iliac artery and left common iliac artery are also dilated measuring 1.6 cm each. Moderate atherosclerotic vascular calcifications. - OTHER: No ascites.     No acute pathology in abdomen/pelvis. Normal appendix. Colonic diverticulosis without diverticulitis. Ectasia of the distal descending aorta and common iliac arteries bilaterally. Electronically signed by [employer] CHEST PORTABLE  Result Date: 4/3/2025  Chest X-ray INDICATION: hypotension COMPARISON:  None TECHNIQUE: AP/PA view of the chest was obtained. FINDINGS: The lungs are clear. There are no infiltrates or effusions.  The heart size is normal.  The bony thorax is intact.      No acute findings in the chest Electronically signed by Star Millan        Signed:  YUE GONZALEZ DO    Part of this note may have been written by using a voice dictation software.  The note has been proof read but may still contain some grammatical/other typographical errors.

## 2025-04-03 NOTE — ED PROVIDER NOTES
findings in the chest         Electronically signed by Star Millan                   No results for input(s): \"COVID19\" in the last 72 hours.    Voice dictation software was used during the making of this note.  This software is not perfect and grammatical and other typographical errors may be present.  This note has not been completely proofread for errors.     Edgar Dean DO  04/03/25 1422

## 2025-04-03 NOTE — ED TRIAGE NOTES
Patient arrives to ED from home with sister. Per sister patient has been sick for a long time but patient has not wanted to be seen. Patient is a diabetic and family does not think he has been taking his insulin. Patient reports vomiting and being sick for \"about a month\"

## 2025-04-04 LAB
ANION GAP SERPL CALC-SCNC: 12 MMOL/L (ref 7–16)
ANION GAP SERPL CALC-SCNC: 13 MMOL/L (ref 7–16)
ANION GAP SERPL CALC-SCNC: 13 MMOL/L (ref 7–16)
ANION GAP SERPL CALC-SCNC: 15 MMOL/L (ref 7–16)
BASOPHILS # BLD: 0.05 K/UL (ref 0–0.2)
BASOPHILS NFR BLD: 0.5 % (ref 0–2)
BUN SERPL-MCNC: 43 MG/DL (ref 8–23)
BUN SERPL-MCNC: 46 MG/DL (ref 8–23)
BUN SERPL-MCNC: 51 MG/DL (ref 8–23)
BUN SERPL-MCNC: 52 MG/DL (ref 8–23)
CALCIUM SERPL-MCNC: 8.6 MG/DL (ref 8.8–10.2)
CALCIUM SERPL-MCNC: 8.6 MG/DL (ref 8.8–10.2)
CALCIUM SERPL-MCNC: 8.8 MG/DL (ref 8.8–10.2)
CALCIUM SERPL-MCNC: 9 MG/DL (ref 8.8–10.2)
CHLORIDE SERPL-SCNC: 101 MMOL/L (ref 98–107)
CHLORIDE SERPL-SCNC: 101 MMOL/L (ref 98–107)
CHLORIDE SERPL-SCNC: 96 MMOL/L (ref 98–107)
CHLORIDE SERPL-SCNC: 99 MMOL/L (ref 98–107)
CO2 SERPL-SCNC: 20 MMOL/L (ref 20–29)
CO2 SERPL-SCNC: 21 MMOL/L (ref 20–29)
CO2 SERPL-SCNC: 23 MMOL/L (ref 20–29)
CO2 SERPL-SCNC: 23 MMOL/L (ref 20–29)
CREAT SERPL-MCNC: 1.93 MG/DL (ref 0.8–1.3)
CREAT SERPL-MCNC: 1.96 MG/DL (ref 0.8–1.3)
CREAT SERPL-MCNC: 2.2 MG/DL (ref 0.8–1.3)
CREAT SERPL-MCNC: 2.21 MG/DL (ref 0.8–1.3)
DIFFERENTIAL METHOD BLD: ABNORMAL
EOSINOPHIL # BLD: 0.08 K/UL (ref 0–0.8)
EOSINOPHIL NFR BLD: 0.8 % (ref 0.5–7.8)
ERYTHROCYTE [DISTWIDTH] IN BLOOD BY AUTOMATED COUNT: 13.4 % (ref 11.9–14.6)
GLUCOSE BLD STRIP.AUTO-MCNC: 144 MG/DL (ref 65–100)
GLUCOSE BLD STRIP.AUTO-MCNC: 150 MG/DL (ref 65–100)
GLUCOSE BLD STRIP.AUTO-MCNC: 152 MG/DL (ref 65–100)
GLUCOSE BLD STRIP.AUTO-MCNC: 153 MG/DL (ref 65–100)
GLUCOSE BLD STRIP.AUTO-MCNC: 155 MG/DL (ref 65–100)
GLUCOSE BLD STRIP.AUTO-MCNC: 161 MG/DL (ref 65–100)
GLUCOSE BLD STRIP.AUTO-MCNC: 183 MG/DL (ref 65–100)
GLUCOSE BLD STRIP.AUTO-MCNC: 190 MG/DL (ref 65–100)
GLUCOSE BLD STRIP.AUTO-MCNC: 287 MG/DL (ref 65–100)
GLUCOSE BLD STRIP.AUTO-MCNC: 341 MG/DL (ref 65–100)
GLUCOSE BLD STRIP.AUTO-MCNC: 372 MG/DL (ref 65–100)
GLUCOSE BLD STRIP.AUTO-MCNC: 384 MG/DL (ref 65–100)
GLUCOSE SERPL-MCNC: 152 MG/DL (ref 70–99)
GLUCOSE SERPL-MCNC: 168 MG/DL (ref 70–99)
GLUCOSE SERPL-MCNC: 288 MG/DL (ref 70–99)
GLUCOSE SERPL-MCNC: 375 MG/DL (ref 70–99)
HCT VFR BLD AUTO: 38.8 % (ref 41.1–50.3)
HGB BLD-MCNC: 12.9 G/DL (ref 13.6–17.2)
IMM GRANULOCYTES # BLD AUTO: 0.03 K/UL (ref 0–0.5)
IMM GRANULOCYTES NFR BLD AUTO: 0.3 % (ref 0–5)
LACTATE SERPL-SCNC: 1.4 MMOL/L (ref 0.5–2)
LACTATE SERPL-SCNC: 1.8 MMOL/L (ref 0.5–2)
LACTATE SERPL-SCNC: 2.3 MMOL/L (ref 0.5–2)
LACTATE SERPL-SCNC: 2.5 MMOL/L (ref 0.5–2)
LACTATE SERPL-SCNC: 2.6 MMOL/L (ref 0.5–2)
LYMPHOCYTES # BLD: 1.57 K/UL (ref 0.5–4.6)
LYMPHOCYTES NFR BLD: 15.9 % (ref 13–44)
MAGNESIUM SERPL-MCNC: 2.2 MG/DL (ref 1.8–2.4)
MAGNESIUM SERPL-MCNC: 2.3 MG/DL (ref 1.8–2.4)
MAGNESIUM SERPL-MCNC: 2.3 MG/DL (ref 1.8–2.4)
MCH RBC QN AUTO: 26.6 PG (ref 26.1–32.9)
MCHC RBC AUTO-ENTMCNC: 33.2 G/DL (ref 31.4–35)
MCV RBC AUTO: 80 FL (ref 82–102)
MONOCYTES # BLD: 1.18 K/UL (ref 0.1–1.3)
MONOCYTES NFR BLD: 12 % (ref 4–12)
NEUTS SEG # BLD: 6.94 K/UL (ref 1.7–8.2)
NEUTS SEG NFR BLD: 70.5 % (ref 43–78)
NRBC # BLD: 0 K/UL (ref 0–0.2)
PHOSPHATE SERPL-MCNC: 3 MG/DL (ref 2.5–4.5)
PHOSPHATE SERPL-MCNC: 3 MG/DL (ref 2.5–4.5)
PHOSPHATE SERPL-MCNC: 3.3 MG/DL (ref 2.5–4.5)
PLATELET # BLD AUTO: 215 K/UL (ref 150–450)
PMV BLD AUTO: 10.6 FL (ref 9.4–12.3)
POTASSIUM SERPL-SCNC: 4.3 MMOL/L (ref 3.5–5.1)
POTASSIUM SERPL-SCNC: 4.4 MMOL/L (ref 3.5–5.1)
POTASSIUM SERPL-SCNC: 4.6 MMOL/L (ref 3.5–5.1)
POTASSIUM SERPL-SCNC: ABNORMAL MMOL/L (ref 3.5–5.1)
RBC # BLD AUTO: 4.85 M/UL (ref 4.23–5.6)
SERVICE CMNT-IMP: ABNORMAL
SODIUM SERPL-SCNC: 132 MMOL/L (ref 136–145)
SODIUM SERPL-SCNC: 133 MMOL/L (ref 136–145)
SODIUM SERPL-SCNC: 136 MMOL/L (ref 136–145)
SODIUM SERPL-SCNC: 136 MMOL/L (ref 136–145)
WBC # BLD AUTO: 9.9 K/UL (ref 4.3–11.1)

## 2025-04-04 PROCEDURE — 6370000000 HC RX 637 (ALT 250 FOR IP): Performed by: GENERAL PRACTICE

## 2025-04-04 PROCEDURE — 1100000000 HC RM PRIVATE

## 2025-04-04 PROCEDURE — 83735 ASSAY OF MAGNESIUM: CPT

## 2025-04-04 PROCEDURE — 94761 N-INVAS EAR/PLS OXIMETRY MLT: CPT

## 2025-04-04 PROCEDURE — 84100 ASSAY OF PHOSPHORUS: CPT

## 2025-04-04 PROCEDURE — 85025 COMPLETE CBC W/AUTO DIFF WBC: CPT

## 2025-04-04 PROCEDURE — 6370000000 HC RX 637 (ALT 250 FOR IP): Performed by: INTERNAL MEDICINE

## 2025-04-04 PROCEDURE — 6370000000 HC RX 637 (ALT 250 FOR IP): Performed by: FAMILY MEDICINE

## 2025-04-04 PROCEDURE — 80048 BASIC METABOLIC PNL TOTAL CA: CPT

## 2025-04-04 PROCEDURE — 6360000002 HC RX W HCPCS: Performed by: FAMILY MEDICINE

## 2025-04-04 PROCEDURE — 2580000003 HC RX 258: Performed by: GENERAL PRACTICE

## 2025-04-04 PROCEDURE — 97530 THERAPEUTIC ACTIVITIES: CPT

## 2025-04-04 PROCEDURE — 97165 OT EVAL LOW COMPLEX 30 MIN: CPT

## 2025-04-04 PROCEDURE — 82962 GLUCOSE BLOOD TEST: CPT

## 2025-04-04 PROCEDURE — 6370000000 HC RX 637 (ALT 250 FOR IP): Performed by: NURSE PRACTITIONER

## 2025-04-04 PROCEDURE — 97535 SELF CARE MNGMENT TRAINING: CPT

## 2025-04-04 PROCEDURE — 2580000003 HC RX 258: Performed by: FAMILY MEDICINE

## 2025-04-04 PROCEDURE — 2700000000 HC OXYGEN THERAPY PER DAY

## 2025-04-04 PROCEDURE — 83605 ASSAY OF LACTIC ACID: CPT

## 2025-04-04 PROCEDURE — 36415 COLL VENOUS BLD VENIPUNCTURE: CPT

## 2025-04-04 PROCEDURE — 97161 PT EVAL LOW COMPLEX 20 MIN: CPT

## 2025-04-04 RX ORDER — INSULIN LISPRO 100 [IU]/ML
4 INJECTION, SOLUTION INTRAVENOUS; SUBCUTANEOUS ONCE
Status: COMPLETED | OUTPATIENT
Start: 2025-04-04 | End: 2025-04-04

## 2025-04-04 RX ORDER — INSULIN GLARGINE 100 [IU]/ML
18 INJECTION, SOLUTION SUBCUTANEOUS DAILY
Status: DISCONTINUED | OUTPATIENT
Start: 2025-04-04 | End: 2025-04-05

## 2025-04-04 RX ORDER — INSULIN LISPRO 100 [IU]/ML
0-8 INJECTION, SOLUTION INTRAVENOUS; SUBCUTANEOUS
Status: DISCONTINUED | OUTPATIENT
Start: 2025-04-04 | End: 2025-04-07 | Stop reason: HOSPADM

## 2025-04-04 RX ORDER — RISPERIDONE 1 MG/1
2 TABLET ORAL NIGHTLY
Status: DISCONTINUED | OUTPATIENT
Start: 2025-04-04 | End: 2025-04-04

## 2025-04-04 RX ADMIN — DEXTROSE AND SODIUM CHLORIDE: 5; .45 INJECTION, SOLUTION INTRAVENOUS at 04:31

## 2025-04-04 RX ADMIN — INSULIN GLARGINE 18 UNITS: 100 INJECTION, SOLUTION SUBCUTANEOUS at 08:53

## 2025-04-04 RX ADMIN — SODIUM CHLORIDE: 4.5 INJECTION, SOLUTION INTRAVENOUS at 15:00

## 2025-04-04 RX ADMIN — NYSTATIN 500000 UNITS: 100000 SUSPENSION ORAL at 08:53

## 2025-04-04 RX ADMIN — INSULIN LISPRO 8 UNITS: 100 INJECTION, SOLUTION INTRAVENOUS; SUBCUTANEOUS at 17:25

## 2025-04-04 RX ADMIN — POTASSIUM CHLORIDE 10 MEQ: 7.46 INJECTION, SOLUTION INTRAVENOUS at 05:41

## 2025-04-04 RX ADMIN — NYSTATIN 500000 UNITS: 100000 SUSPENSION ORAL at 21:54

## 2025-04-04 RX ADMIN — NYSTATIN 500000 UNITS: 100000 SUSPENSION ORAL at 17:26

## 2025-04-04 RX ADMIN — INSULIN LISPRO 4 UNITS: 100 INJECTION, SOLUTION INTRAVENOUS; SUBCUTANEOUS at 12:59

## 2025-04-04 RX ADMIN — POTASSIUM CHLORIDE 10 MEQ: 7.46 INJECTION, SOLUTION INTRAVENOUS at 00:27

## 2025-04-04 RX ADMIN — INSULIN LISPRO 6 UNITS: 100 INJECTION, SOLUTION INTRAVENOUS; SUBCUTANEOUS at 20:10

## 2025-04-04 RX ADMIN — POTASSIUM CHLORIDE 10 MEQ: 7.46 INJECTION, SOLUTION INTRAVENOUS at 04:40

## 2025-04-04 RX ADMIN — ENOXAPARIN SODIUM 40 MG: 100 INJECTION SUBCUTANEOUS at 17:26

## 2025-04-04 RX ADMIN — NYSTATIN 500000 UNITS: 100000 SUSPENSION ORAL at 13:09

## 2025-04-04 RX ADMIN — INSULIN LISPRO 4 UNITS: 100 INJECTION, SOLUTION INTRAVENOUS; SUBCUTANEOUS at 17:27

## 2025-04-04 RX ADMIN — SODIUM CHLORIDE 3 UNITS/HR: 9 INJECTION, SOLUTION INTRAVENOUS at 04:58

## 2025-04-04 RX ADMIN — SODIUM CHLORIDE: 4.5 INJECTION, SOLUTION INTRAVENOUS at 23:10

## 2025-04-04 ASSESSMENT — PAIN SCALES - GENERAL
PAINLEVEL_OUTOF10: 0
PAINLEVEL_OUTOF10: 0

## 2025-04-04 NOTE — DIABETES MGMT
Patient seen for assessment regarding diabetes management by diabetes educator. Admitting blood glucose 695. A1c 22.8 (eAG 608). Patient has a past medical history of DM type 2, HLD, HTN, schizophrenia. Patient last seen by diabetes educator 4/27/2023. Patient states they do have a working glucometer with supplies at home. Per patient they have not been checking blood glucose at home. Patient states they are currently prescribed Lantus 24 units daily and Humalog 12 units with meals but patient states \"has not been taking insulin like I should\" at home for management of diabetes. Patient states \"I have not been taking care of my self.\"  Patient voices that they have not experienced hypoglycemia in the past. Educated regarding hypoglycemia signs, symptoms, and treatment. Patient has not attended formal diabetes education in the past. Patient reports no difficulty with affording their diabetic supplies. Patient states PCP is Karthikeyan Howard.    Patient given educational material, \"Diabetes Self-Management: A Patient Teaching Guide\", which was reviewed with patient. Explained basic physiology of diabetes, as well as causes, signs and symptoms, and treatments for hypoglycemia and hyperglycemia. Described the effects of poor glycemic control and the development of long-term complications such as renal, eye, nerve, and cardiovascular disease. Patient denies numbness and tingling in feet. Described proper diabetic foot care and the importance of checking feet daily. Per patient they typically drink water and orange juice. Reviewed effects of sweetened beverages on glycemic control and discussed alternative beverages to help improve glycemic control. Discussed diet juice.  Per patient they typically eat eggs with cheese for breakfast, cheese/lettuce/tomato sandwich for lunch, and raisin toast, sometimes eats saltine crackers. Educated re: effects of carbohydrates on blood glucose, the \"plate method\" of healthy meal planning, basics

## 2025-04-04 NOTE — INTERDISCIPLINARY ROUNDS
Multi-D Rounds/Checklist (leapfrog):  Lines: can any be removed?: None      DVT Prophylaxis: Ordered  Vent: N/A  Nutrition Ordered/appropriate: Contraindicated- DKA  Can antibiotics or other drugs be stopped? N/A   MRSA swab:     Inpat Anti-Infectives (From admission, onward)      None          Consults needed: None  A: Is pain control adequate? (has PRNs? Stop drip?) Yes  B: Sedation break and SBT? N/A  C: Is sedation choice appropriate? N/A  D: Delirium/CAM-ICU? No  E: Mobility goals/appropriateness? Yes  F: Family update and plan? sister is surrogate decision maker and is being updated daily by primary attending and nursing staff.    Maggi Barcenas, APRN - NP

## 2025-04-04 NOTE — THERAPY EVALUATION
mobility at baseline. No hx recent falls. This date, pt performed bed mobility transfers/supine to sit EOB with SBA. Pt presented with good/fair(+) sitting balance EOB requiring SBA for safety. Pt then performed sit<>stand transfers, bed to chair transfer, and functional mobility for ADLs using rolling walker with CGA and cues for walker management and hand placement. Pt assisted with self-grooming ADLs standing edge of sink, requiring CGA for dynamic balance control. Pt positioned comfortably sitting up in transport chair at end of session with all needs met and within reach. Awaiting transport to the floor. RN notified of pt's performance.    Pt presents as functioning below his baseline with overall deficits in ADL performance, functional transfers, household mobility for ADLs, strength, balance, and activity tolerance. Pt will benefit from skilled OT services at this time in order to address functional deficits and OT goals stated above. Recommend HHOT at d/c. Will continue to monitor and advance as indicated.      [facility] AM-PAC™ “6 Clicks” Daily Activity Inpatient Short Form:    AM-PAC Daily Activity - Inpatient   How much help is needed for putting on and taking off regular lower body clothing?: A Little  How much help is needed for bathing (which includes washing, rinsing, drying)?: A Little  How much help is needed for toileting (which includes using toilet, bedpan, or urinal)?: A Little  How much help is needed for putting on and taking off regular upper body clothing?: A Little  How much help is needed for taking care of personal grooming?: A Little  How much help for eating meals?: None  AM-[facility] Inpatient Daily Activity Raw Score: 19  AM-PAC Inpatient ADL T-Scale Score : 40.22  ADL Inpatient CMS 0-100% Score: 42.8  ADL Inpatient CMS G-Code Modifier : CK        SUBJECTIVE:     Mr. Whitmore states, \"Thank you very much!\"     Social/Functional Lives With: Alone  Type of Home: House  Home Layout:

## 2025-04-04 NOTE — ACP (ADVANCE CARE PLANNING)
Advance Care Planning     Advance Care Planning Inpatient Note  [facility] Department    Today's Date: 4/4/2025  Unit: SFD 3 INTENSIVE CARE    Received request from HealthCare Provider.  Upon review of chart and communication with care team, patient's decision making abilities are not in question.. Patient was/were present in the room during visit.    Goals of ACP Conversation:  Discuss advance care planning documents    Health Care Decision Makers:     Legal next of kin per patient: Dahiana Whitmore (daughter)  Documented Next of Kin, per patient report    Advance Care Planning Documents (Patient Wishes):  None     Assessment:   received consult for HCPOA.  Patient states that he is not interested in naming a decision maker or completing paperwork, stating that he relies on his self.  Patient identified a daughter (Dahiana Whitmore) who lives locally, but states that they have not spoken in 20 years.  Patient did not have contact information for daughter.  Patient states that he understands daughter will have a legal right to make decisions for him if he is not able to make his own decisions.  Patient did not want to complete an advanced directive, but states he would want maximum treatment, unless otherwise directed, if he is not able to make his own decisions.      Interventions:  Reviewed but did not complete ACP document    Care Preferences Communicated:   No    Outcomes/Plan:  ACP Discussion: Completed  Teach Back Method used to verify the patient's and/or Healthcare Decision Maker's understanding of key information in the advance directive documents    Electronically signed by Chaplain Kel on 4/4/2025 at 1:34 PM

## 2025-04-05 LAB
BASOPHILS # BLD: 0.07 K/UL (ref 0–0.2)
BASOPHILS NFR BLD: 0.9 % (ref 0–2)
DIFFERENTIAL METHOD BLD: ABNORMAL
EOSINOPHIL # BLD: 0.09 K/UL (ref 0–0.8)
EOSINOPHIL NFR BLD: 1.2 % (ref 0.5–7.8)
ERYTHROCYTE [DISTWIDTH] IN BLOOD BY AUTOMATED COUNT: 13.4 % (ref 11.9–14.6)
GLUCOSE BLD STRIP.AUTO-MCNC: 281 MG/DL (ref 65–100)
GLUCOSE BLD STRIP.AUTO-MCNC: 363 MG/DL (ref 65–100)
GLUCOSE BLD STRIP.AUTO-MCNC: 374 MG/DL (ref 65–100)
GLUCOSE BLD STRIP.AUTO-MCNC: 436 MG/DL (ref 65–100)
HCT VFR BLD AUTO: 39.7 % (ref 41.1–50.3)
HGB BLD-MCNC: 12.9 G/DL (ref 13.6–17.2)
IMM GRANULOCYTES # BLD AUTO: 0.02 K/UL (ref 0–0.5)
IMM GRANULOCYTES NFR BLD AUTO: 0.3 % (ref 0–5)
LACTATE SERPL-SCNC: 1.4 MMOL/L (ref 0.5–2)
LACTATE SERPL-SCNC: 1.7 MMOL/L (ref 0.5–2)
LACTATE SERPL-SCNC: 1.9 MMOL/L (ref 0.5–2)
LYMPHOCYTES # BLD: 1.77 K/UL (ref 0.5–4.6)
LYMPHOCYTES NFR BLD: 24 % (ref 13–44)
MCH RBC QN AUTO: 26.8 PG (ref 26.1–32.9)
MCHC RBC AUTO-ENTMCNC: 32.5 G/DL (ref 31.4–35)
MCV RBC AUTO: 82.4 FL (ref 82–102)
MONOCYTES # BLD: 0.72 K/UL (ref 0.1–1.3)
MONOCYTES NFR BLD: 9.8 % (ref 4–12)
NEUTS SEG # BLD: 4.7 K/UL (ref 1.7–8.2)
NEUTS SEG NFR BLD: 63.8 % (ref 43–78)
NRBC # BLD: 0 K/UL (ref 0–0.2)
PLATELET # BLD AUTO: 196 K/UL (ref 150–450)
PMV BLD AUTO: 10.9 FL (ref 9.4–12.3)
RBC # BLD AUTO: 4.82 M/UL (ref 4.23–5.6)
SERVICE CMNT-IMP: ABNORMAL
WBC # BLD AUTO: 7.4 K/UL (ref 4.3–11.1)

## 2025-04-05 PROCEDURE — 1100000000 HC RM PRIVATE

## 2025-04-05 PROCEDURE — 6370000000 HC RX 637 (ALT 250 FOR IP): Performed by: INTERNAL MEDICINE

## 2025-04-05 PROCEDURE — 85025 COMPLETE CBC W/AUTO DIFF WBC: CPT

## 2025-04-05 PROCEDURE — 2580000003 HC RX 258: Performed by: FAMILY MEDICINE

## 2025-04-05 PROCEDURE — 82962 GLUCOSE BLOOD TEST: CPT

## 2025-04-05 PROCEDURE — 6360000002 HC RX W HCPCS: Performed by: FAMILY MEDICINE

## 2025-04-05 PROCEDURE — 83605 ASSAY OF LACTIC ACID: CPT

## 2025-04-05 PROCEDURE — 6370000000 HC RX 637 (ALT 250 FOR IP): Performed by: FAMILY MEDICINE

## 2025-04-05 PROCEDURE — 36415 COLL VENOUS BLD VENIPUNCTURE: CPT

## 2025-04-05 RX ORDER — INSULIN GLARGINE 100 [IU]/ML
25 INJECTION, SOLUTION SUBCUTANEOUS DAILY
Status: DISCONTINUED | OUTPATIENT
Start: 2025-04-05 | End: 2025-04-06

## 2025-04-05 RX ORDER — INSULIN GLARGINE 100 [IU]/ML
10 INJECTION, SOLUTION SUBCUTANEOUS ONCE
Status: COMPLETED | OUTPATIENT
Start: 2025-04-05 | End: 2025-04-05

## 2025-04-05 RX ADMIN — INSULIN LISPRO 8 UNITS: 100 INJECTION, SOLUTION INTRAVENOUS; SUBCUTANEOUS at 11:57

## 2025-04-05 RX ADMIN — NYSTATIN 500000 UNITS: 100000 SUSPENSION ORAL at 09:09

## 2025-04-05 RX ADMIN — NYSTATIN 500000 UNITS: 100000 SUSPENSION ORAL at 17:40

## 2025-04-05 RX ADMIN — SODIUM CHLORIDE: 4.5 INJECTION, SOLUTION INTRAVENOUS at 06:55

## 2025-04-05 RX ADMIN — NYSTATIN 500000 UNITS: 100000 SUSPENSION ORAL at 22:04

## 2025-04-05 RX ADMIN — INSULIN LISPRO 8 UNITS: 100 INJECTION, SOLUTION INTRAVENOUS; SUBCUTANEOUS at 22:03

## 2025-04-05 RX ADMIN — INSULIN LISPRO 4 UNITS: 100 INJECTION, SOLUTION INTRAVENOUS; SUBCUTANEOUS at 09:09

## 2025-04-05 RX ADMIN — SODIUM CHLORIDE: 4.5 INJECTION, SOLUTION INTRAVENOUS at 14:48

## 2025-04-05 RX ADMIN — INSULIN GLARGINE 10 UNITS: 100 INJECTION, SOLUTION SUBCUTANEOUS at 17:40

## 2025-04-05 RX ADMIN — INSULIN LISPRO 8 UNITS: 100 INJECTION, SOLUTION INTRAVENOUS; SUBCUTANEOUS at 17:40

## 2025-04-05 RX ADMIN — NYSTATIN 500000 UNITS: 100000 SUSPENSION ORAL at 13:48

## 2025-04-05 RX ADMIN — ENOXAPARIN SODIUM 40 MG: 100 INJECTION SUBCUTANEOUS at 17:39

## 2025-04-05 RX ADMIN — INSULIN GLARGINE 25 UNITS: 100 INJECTION, SOLUTION SUBCUTANEOUS at 09:09

## 2025-04-05 NOTE — ICUWATCH
RRT Clinical Rounding Nurse Update    Vitals:    04/04/25 1441 04/04/25 1938 04/04/25 2249 04/05/25 0314   BP: (!) 151/65 118/70  125/60   Pulse: 69 74 74 63   Resp: 18 17 16 16   Temp: 97.9 °F (36.6 °C) 98.1 °F (36.7 °C)  97.9 °F (36.6 °C)   TempSrc: Oral Oral  Oral   SpO2: 100% 100% 100% 100%   Weight:       Height:            DETERIORATION INDEX SCORE: 32    ASSESSMENT:  Previous outreach assessment was reviewed. There have been no significant changes since previous assessment.    PLAN:  Will follow per RRT Clinical Rounding Program protocol.    Reed Trejo RN  [facility]: [phone]  [facility]: [phone]

## 2025-04-05 NOTE — ICUWATCH
RRT Clinical Rounding Nurse Update    Vitals:    04/04/25 1938 04/04/25 2249 04/05/25 0314 04/05/25 0747   BP: 118/70  125/60 118/83   Pulse: 74 74 63 78   Resp: 17 16 16 19   Temp: 98.1 °F (36.7 °C)  97.9 °F (36.6 °C) 98.1 °F (36.7 °C)   TempSrc: Oral  Oral Oral   SpO2: 100% 100% 100% 100%   Weight:       Height:          ASSESSMENT:  Previous outreach assessment was reviewed. There have been no significant changes since previous assessment. Patient alert and oriented. Respirations unlabored. Denies complaints. VS, labs, and progress notes reviewed.     PLAN:  Will discharge from RRT Clinical Rounding Program per protocol. Please call if needed.    Jesse Contreras RN  [facility]: [phone]  [facility]: [phone]

## 2025-04-05 NOTE — ICUWATCH
RRT Clinical Rounding Nurse Progress Report      SUBJECTIVE: Patient assessed secondary to transfer from critical care.      Vitals:    04/04/25 1330 04/04/25 1345 04/04/25 1441 04/04/25 1938   BP:   (!) 151/65 118/70   Pulse: 89 79 69 74   Resp: 27  18 17   Temp:   97.9 °F (36.6 °C) 98.1 °F (36.7 °C)   TempSrc:   Oral Oral   SpO2: 95%  100% 100%   Weight:       Height:            DETERIORATION INDEX SCORE: 33     ASSESSMENT:  Pt sitting at bedside A&Ox4 on 3L NC, denies CP or SOB. Lung sounds clear and equal, chest expansion symmetric and unlabored. Most recent  SQ insulin administered, anion gap at 1400 up to 15. Recent labs, VS and progress notes reviewed.    PLAN:  Will follow per RRT Clinical Rounding Program protocol.    Reed Trejo RN  [facility]: [phone]  [facility]: [phone]

## 2025-04-06 LAB
ANION GAP SERPL CALC-SCNC: 9 MMOL/L (ref 7–16)
BUN SERPL-MCNC: 19 MG/DL (ref 8–23)
CALCIUM SERPL-MCNC: 8.9 MG/DL (ref 8.8–10.2)
CHLORIDE SERPL-SCNC: 101 MMOL/L (ref 98–107)
CO2 SERPL-SCNC: 25 MMOL/L (ref 20–29)
CREAT SERPL-MCNC: 1.24 MG/DL (ref 0.8–1.3)
GLUCOSE BLD STRIP.AUTO-MCNC: 219 MG/DL (ref 65–100)
GLUCOSE BLD STRIP.AUTO-MCNC: 243 MG/DL (ref 65–100)
GLUCOSE BLD STRIP.AUTO-MCNC: 276 MG/DL (ref 65–100)
GLUCOSE BLD STRIP.AUTO-MCNC: 281 MG/DL (ref 65–100)
GLUCOSE BLD STRIP.AUTO-MCNC: 357 MG/DL (ref 65–100)
GLUCOSE SERPL-MCNC: 228 MG/DL (ref 70–99)
POTASSIUM SERPL-SCNC: 4.4 MMOL/L (ref 3.5–5.1)
SERVICE CMNT-IMP: ABNORMAL
SODIUM SERPL-SCNC: 135 MMOL/L (ref 136–145)

## 2025-04-06 PROCEDURE — 1100000000 HC RM PRIVATE

## 2025-04-06 PROCEDURE — 80048 BASIC METABOLIC PNL TOTAL CA: CPT

## 2025-04-06 PROCEDURE — 36415 COLL VENOUS BLD VENIPUNCTURE: CPT

## 2025-04-06 PROCEDURE — 6370000000 HC RX 637 (ALT 250 FOR IP): Performed by: INTERNAL MEDICINE

## 2025-04-06 PROCEDURE — 6360000002 HC RX W HCPCS: Performed by: FAMILY MEDICINE

## 2025-04-06 PROCEDURE — 6370000000 HC RX 637 (ALT 250 FOR IP): Performed by: FAMILY MEDICINE

## 2025-04-06 PROCEDURE — 82962 GLUCOSE BLOOD TEST: CPT

## 2025-04-06 RX ORDER — INSULIN GLARGINE 100 [IU]/ML
20 INJECTION, SOLUTION SUBCUTANEOUS 2 TIMES DAILY
Status: DISCONTINUED | OUTPATIENT
Start: 2025-04-06 | End: 2025-04-07 | Stop reason: HOSPADM

## 2025-04-06 RX ADMIN — INSULIN GLARGINE 20 UNITS: 100 INJECTION, SOLUTION SUBCUTANEOUS at 08:45

## 2025-04-06 RX ADMIN — NYSTATIN 500000 UNITS: 100000 SUSPENSION ORAL at 21:09

## 2025-04-06 RX ADMIN — INSULIN LISPRO 2 UNITS: 100 INJECTION, SOLUTION INTRAVENOUS; SUBCUTANEOUS at 21:07

## 2025-04-06 RX ADMIN — NYSTATIN 500000 UNITS: 100000 SUSPENSION ORAL at 08:45

## 2025-04-06 RX ADMIN — INSULIN LISPRO 4 UNITS: 100 INJECTION, SOLUTION INTRAVENOUS; SUBCUTANEOUS at 16:51

## 2025-04-06 RX ADMIN — INSULIN LISPRO 8 UNITS: 100 INJECTION, SOLUTION INTRAVENOUS; SUBCUTANEOUS at 12:09

## 2025-04-06 RX ADMIN — INSULIN LISPRO 2 UNITS: 100 INJECTION, SOLUTION INTRAVENOUS; SUBCUTANEOUS at 08:45

## 2025-04-06 RX ADMIN — INSULIN GLARGINE 20 UNITS: 100 INJECTION, SOLUTION SUBCUTANEOUS at 21:06

## 2025-04-06 RX ADMIN — NYSTATIN 500000 UNITS: 100000 SUSPENSION ORAL at 16:51

## 2025-04-06 RX ADMIN — ENOXAPARIN SODIUM 40 MG: 100 INJECTION SUBCUTANEOUS at 16:51

## 2025-04-07 VITALS
WEIGHT: 177.47 LBS | RESPIRATION RATE: 16 BRPM | OXYGEN SATURATION: 98 % | HEIGHT: 71 IN | HEART RATE: 78 BPM | BODY MASS INDEX: 24.85 KG/M2 | TEMPERATURE: 97.9 F | DIASTOLIC BLOOD PRESSURE: 76 MMHG | SYSTOLIC BLOOD PRESSURE: 128 MMHG

## 2025-04-07 LAB
GLUCOSE BLD STRIP.AUTO-MCNC: 232 MG/DL (ref 65–100)
GLUCOSE BLD STRIP.AUTO-MCNC: 263 MG/DL (ref 65–100)
SERVICE CMNT-IMP: ABNORMAL
SERVICE CMNT-IMP: ABNORMAL

## 2025-04-07 PROCEDURE — 6370000000 HC RX 637 (ALT 250 FOR IP): Performed by: INTERNAL MEDICINE

## 2025-04-07 PROCEDURE — 6370000000 HC RX 637 (ALT 250 FOR IP): Performed by: FAMILY MEDICINE

## 2025-04-07 PROCEDURE — 82962 GLUCOSE BLOOD TEST: CPT

## 2025-04-07 RX ORDER — NYSTATIN 100000 [USP'U]/ML
5 SUSPENSION ORAL 4 TIMES DAILY
Qty: 140 ML | Refills: 0 | Status: SHIPPED | OUTPATIENT
Start: 2025-04-07 | End: 2025-04-14

## 2025-04-07 RX ADMIN — NYSTATIN 500000 UNITS: 100000 SUSPENSION ORAL at 11:59

## 2025-04-07 RX ADMIN — INSULIN LISPRO 4 UNITS: 100 INJECTION, SOLUTION INTRAVENOUS; SUBCUTANEOUS at 11:59

## 2025-04-07 RX ADMIN — NYSTATIN 500000 UNITS: 100000 SUSPENSION ORAL at 09:14

## 2025-04-07 RX ADMIN — INSULIN GLARGINE 20 UNITS: 100 INJECTION, SOLUTION SUBCUTANEOUS at 09:14

## 2025-04-07 RX ADMIN — INSULIN LISPRO 2 UNITS: 100 INJECTION, SOLUTION INTRAVENOUS; SUBCUTANEOUS at 09:14

## 2025-04-07 NOTE — DISCHARGE SUMMARY
Hospitalist Discharge Summary   Admit Date:  4/3/2025 10:40 AM   DC Note date: 2025  Name:  Kevin Whitmore   Age:  74 y.o.  Sex:  male  :  1950   MRN:  [id number]   Room:    PCP:  Karthikeyan Howard MD    Presenting Complaint: Vomiting and Fatigue    Initial Admission Diagnosis: Hyperkalemia [E87.5]  Acute kidney injury [N17.9]  DKA, type 2, not at goal (HCC) [E11.10]  Type 1 diabetes mellitus with ketoacidosis without coma (HCC) [E10.10]     Problem List for this Hospitalization:    Did Patient have Sepsis (YES OR NO): no    Hospital Course:   74 y.o. male with medical history of   DM       who presented with nausea, vomiting and fatigue.      Patient missed talking his DM medications.  He just stopped taking his insulin in the past month.   In ER, blood sugar was highly elevated in >600 mg/dL. Patient had hyperkalemia and metabolic acidosis.   Patient was treated for DKA as per protocol. He was seen by Diabetic nurse and given education and counseled about compliance. Pt had awalk test and did not qualify for O2. He is being discharged home with op follow up with pcp.     Disposition: Home or Self Care  Diet: ADULT DIET; Regular; 3 carb choices (45 gm/meal)  Code Status: Full Code     Follow Up Orders:  No follow-ups on file.    [unfilled]    Follow up labs/diagnostics (ultimately defer to outpatient provider):  none    Time spent in patient discharge and coordination 25 minutes.    Plan was discussed with pt and sister.  All questions answered.  Patient was stable at time of discharge.  Instructions given to call a physician or return if any concerns.    Discharge Info:      Medication List        START taking these medications      nystatin 484704 UNIT/ML suspension  Commonly known as: MYCOSTATIN  Take 5 mLs by mouth 4 times daily for 7 days            CONTINUE taking these medications      albuterol sulfate  (90 Base) MCG/ACT inhaler  Commonly known as: PROVENTIL;VENTOLIN;PROAIR     Blood

## 2025-04-07 NOTE — PROGRESS NOTES
04/07/25 1211   Resting (Room Air)   SpO2 97      During Walk (Room Air)   SpO2 95      Walk/Assistance Device Ambulation   Rate of Dyspnea 0   After Walk   SpO2 96      FIO2 (%) 21   Rate of Dyspnea 0   Does the Patient Qualify for Home O2 No       
  Physician Progress Note      PATIENT:               ELICIA CARLTON  CSN #:                  [id number]  :                       1950  ADMIT DATE:       4/3/2025 10:40 AM  DISCH DATE:  RESPONDING  PROVIDER #:        Josh Marti MD          QUERY TEXT:    Based on your medical judgment, please clarify these findings and document if   any of the following are being evaluated and/or treated:    The medical record reflects the following:  Risk Factors: 73yo, DKA  Clinical Indicators: Sodium 130, 134, 138, 136, 136, 133, 132  Treatment: labs    Thank you,  Marina Beard), RN BSN  Clinical     The clinical indicators include:  Options provided:  -- Pseudohyponatremia  -- Hyponatremia  -- Other - I will add my own diagnosis  -- Disagree - Not applicable / Not valid  -- Disagree - Clinically unable to determine / Unknown  -- Refer to Clinical Documentation Reviewer    PROVIDER RESPONSE TEXT:    This patient has Pseudohyponatremia.    Query created by: Marina Mercedes on 2025 2:59 PM      Electronically signed by:  Josh Marti MD 2025 3:13 PM          
15:35 called Ana Worthington to get admission data base completed.  Ana's phone went to [employer].  Left a message for a return phone call.      Ana Worthington called and answered what she was able to.  Pt is a poor historian and unable to provide home medication list.  Ana did not have pt's home medication list.  
4 Eyes Skin Assessment     NAME:  Kevin Whitmore  YOB: 1950  MEDICAL RECORD NUMBER:  [id number]    The patient is being assessed for  Admission    I agree that at least one RN has performed a thorough Head to Toe Skin Assessment on the patient. ALL assessment sites listed below have been assessed.      Areas assessed by both nurses:    Head, Face, Ears, Shoulders, Back, Chest, Arms, Elbows, Hands, Sacrum. Buttock, Coccyx, Ischium, Legs. Feet and Heels, and Under Medical Devices         Does the Patient have a Wound? No noted wound(s)       Julio Prevention initiated by RN: No  Wound Care Orders initiated by RN: No    Pressure Injury (Stage 3,4, Unstageable, DTI, NWPT, and Complex wounds) if present, place Wound referral order by RN under : No    New Ostomies, if present place, Ostomy referral order under : No     Nurse 1 eSignature: Electronically signed by Luba Nj RN on 4/3/25 at 6:19 PM EDT    **SHARE this note so that the co-signing nurse can place an eSignature**    Nurse 2 eSignature: Electronically signed by Yesica Chapin RN on 4/3/25 at 6:44 PM EDT   
4 Eyes Skin Assessment     NAME:  Kevin Whitmore  YOB: 1950  MEDICAL RECORD NUMBER:  [id number]    The patient is being assessed for  Transfer to New Unit    I agree that at least one RN has performed a thorough Head to Toe Skin Assessment on the patient. ALL assessment sites listed below have been assessed.      Areas assessed by both nurses:    Head, Face, Ears, Shoulders, Back, Chest, Arms, Elbows, Hands, Sacrum. Buttock, Coccyx, Ischium, and Legs. Feet and Heels        Does the Patient have a Wound? No noted wound(s)       Julio Prevention initiated by RN: Yes  Wound Care Orders initiated by RN: No    Pressure Injury (Stage 3,4, Unstageable, DTI, NWPT, and Complex wounds) if present, place Wound referral order by RN under : No    New Ostomies, if present place, Ostomy referral order under : No     Nurse 1 eSignature: Electronically signed by Karen Arango RN on 4/4/25 at 2:58 PM EDT    **SHARE this note so that the co-signing nurse can place an eSignature**    Nurse 2 eSignature: {Esignature:[id number]}   
ACUTE PHYSICAL THERAPY GOALS:   (Developed with and agreed upon by patient and/or caregiver.)    (1.) Kevin Whitmore  will move from supine to sit and sit to supine  with INDEPENDENCE within 7 treatment day(s).    (2.) Kevin Whitmore will transfer from bed to chair and chair to bed with MODIFIED INDEPENDENCE using the least restrictive device within 7 treatment day(s).    (3.) Kevin Whitmore will ambulate with MODIFIED INDEPENDENCE for 300 feet with the least restrictive device within 7 treatment day(s).   (4.) Kevin Whitmore will perform standing static and dynamic balance activities x 20 minutes with SUPERVISION to improve safety within 7 treatment day(s).  (5.) Kevin Whitmore will perform therapeutic exercises x 20 min for HEP with INDEPENDENCE to improve strength, endurance, and functional mobility within 7 treatment day(s).       PHYSICAL THERAPY Initial Assessment, Daily Note, and PM  (Link to Caseload Tracking: PT Visit Days : 1  Acknowledge Orders  Time In/Out  PT Charge Capture  Rehab Caseload Tracker    Kevin Whitmore is a 74 y.o. male   PRIMARY DIAGNOSIS: DKA, type 2, not at goal (HCC)  Hyperkalemia [E87.5]  Acute kidney injury [N17.9]  DKA, type 2, not at goal (HCC) [E11.10]  Type 1 diabetes mellitus with ketoacidosis without coma (HCC) [E10.10]       Reason for Referral: Generalized Muscle Weakness (M62.81)  Difficulty in walking, Not elsewhere classified (R26.2)  Inpatient: Payor: MEDICARE / Plan: MEDICARE PART A AND B / Product Type: *No Product type* /     ASSESSMENT:     REHAB RECOMMENDATIONS:   Recommendation to date pending progress:  Setting:  Home Health Therapy    Equipment:    None  Pt has cane and RW     ASSESSMENT:  Mr. Whitmore is a 74 year old M who presents with DKA. At baseline, pt is independent with ADL's and mobility, uses a cane. This date pt performs mobility including bed mobility with CGA. He was able to ambulate 40 ft and participate in dynamic standing 
Advance Care Planning     Advance Care Planning Inpatient Note  [facility] Department    Today's Date: 4/4/2025  Unit: SFD 3 INTENSIVE CARE    Received request from HealthCare Provider.  Upon review of chart and communication with care team, patient's decision making abilities are not in question.. Patient was/were present in the room during visit.    Goals of ACP Conversation:  Discuss advance care planning documents    Health Care Decision Makers:     Legal next of kin per patient: Dahiana Whitmore (daughter)  Documented Next of Kin, per patient report    Advance Care Planning Documents (Patient Wishes):  None     Assessment:   received consult for HCPOA.  Patient states that he is not interested in naming a decision maker or completing paperwork, stating that he relies on his self.  Patient identified a daughter (Dahiana Whitmore) who lives locally, but states that they have not spoken in 20 years.  Patient did not have contact information for daughter.  Patient states that he understands daughter will have a legal right to make decisions for him if he is not able to make his own decisions.  Patient did not want to complete an advanced directive, but states he would want maximum treatment, unless otherwise directed, if he is not able to make his own decisions.      Interventions:  Reviewed but did not complete ACP document    Care Preferences Communicated:   No    Outcomes/Plan:  ACP Discussion: Completed  Teach Back Method used to verify the patient's and/or Healthcare Decision Maker's understanding of key information in the advance directive documents    Electronically signed by Chaplain Kel on 4/4/2025 at 1:34 PM  
Before dinner the pt's POCT BG was 384.  HOLLY Porter was made aware and she ordered an extra 4 units of humalog.  12 units total of humalog were given.  POCT BG was 372 approximately 30 after the pt's meal.    
Blood sugar at dinnertime 436. Dr Marti notified and orders received to give extra 10units of lantus now and follow with SS (8 units of humalog. Patient asymptomatic, eating dinner on side of the bed.   
END OF SHIFT NOTE:    INTAKE/OUTPUT  04/05 0701 - 04/06 0700  In: 3726.7 [I.V.:3726.7]  Out: 3575 [Urine:3575]  Voiding: Yes  Catheter: No  Drain:              Flatus: Patient does have flatus present.    Stool: 0 occurrences.    Characteristics:           Stool Assessment  Last BM (including prior to admit): 04/03/25    Emesis:0  occurrences.    Characteristics:        VITAL SIGNS  Patient Vitals for the past 12 hrs:   Temp Pulse Resp BP SpO2   04/06/25 1645 -- -- -- -- 92 %   04/06/25 1600 -- -- -- -- 94 %   04/06/25 1513 98.1 °F (36.7 °C) 60 19 118/68 99 %   04/06/25 1052 98.4 °F (36.9 °C) 65 18 (!) 143/76 99 %   04/06/25 0719 97.7 °F (36.5 °C) (!) 114 18 135/66 100 %       Pain Assessment  Pain Level: 0 (04/04/25 1344)          Ambulating  Yes    Shift report given to oncoming nurse at the bedside.    Rama Hill, RN     
TRANSFER - IN REPORT:    Verbal report received from JOSHUA Davalos on Kevin Whitmore  being received from ICU for routine progression of patient care      Report consisted of patient's Situation, Background, Assessment and   Recommendations(SBAR).     Information from the following report(s) Adult Overview and Neuro Assessment was reviewed with the receiving nurse.    Opportunity for questions and clarification was provided.      Assessment completed upon patient's arrival to unit and care assumed.    
TRANSFER - OUT REPORT:    Verbal report given to JOSHUA LCIFFORD on Kevin Whitmore  being transferred to [facility] for routine progression of patient care       Report consisted of patient's Situation, Background, Assessment and   Recommendations(SBAR).     Information from the following report(s) Nurse Handoff Report, Intake/Output, MAR, Recent Results, and Cardiac Rhythm NSR  was reviewed with the receiving nurse.           Lines:   Peripheral IV 04/03/25 Left Hand (Active)   Site Assessment Clean, dry & intact 04/04/25 0800   Line Status Flushed;Infusing 04/04/25 0800   Line Care Chlorhexidine wipes;Connections checked and tightened 04/04/25 0800   Phlebitis Assessment No symptoms 04/04/25 0800   Infiltration Assessment 0 04/04/25 0800   Alcohol Cap Used Yes 04/04/25 0800   Dressing Status Clean, dry & intact 04/04/25 0800   Dressing Type Transparent 04/04/25 0800       Peripheral IV 04/03/25 Left Antecubital (Active)   Site Assessment Clean, dry & intact 04/04/25 0800   Line Status Flushed;Infusing 04/04/25 0800   Line Care Chlorhexidine wipes;Connections checked and tightened 04/04/25 0800   Phlebitis Assessment No symptoms 04/04/25 0800   Infiltration Assessment 0 04/04/25 0800   Alcohol Cap Used Yes 04/04/25 0800   Dressing Status Clean, dry & intact 04/04/25 0800   Dressing Type Transparent 04/04/25 0800        Opportunity for questions and clarification was provided.      Patient transported with:  O2 @ 3lpm and Registered Nurse        
Therapy Note:  Therapist participated in ICU/CCU IDT rounds and believe patient is currently functioning below baseline functional mobility/ADL performance.  Patient could benefit from skilled therapy services when MD deems medically appropriate.  Thank you,  TERE SERRATO OT    
Ref Range    Lactic Acid 1.7 0.5 - 2.0 mmol/L   POCT Glucose    Collection Time: 04/05/25 11:24 AM   Result Value Ref Range    POC Glucose 374 (H) 65 - 100 mg/dL    Performed by: He    POCT Glucose    Collection Time: 04/05/25  3:18 PM   Result Value Ref Range    POC Glucose 436 (H) 65 - 100 mg/dL    Performed by: He    POCT Glucose    Collection Time: 04/05/25  8:02 PM   Result Value Ref Range    POC Glucose 363 (H) 65 - 100 mg/dL    Performed by: Adrian    Basic Metabolic Panel    Collection Time: 04/06/25  6:44 AM   Result Value Ref Range    Sodium 135 (L) 136 - 145 mmol/L    Potassium 4.4 3.5 - 5.1 mmol/L    Chloride 101 98 - 107 mmol/L    CO2 25 20 - 29 mmol/L    Anion Gap 9 7 - 16 mmol/L    Glucose 228 (H) 70 - 99 mg/dL    BUN 19 8 - 23 MG/DL    Creatinine 1.24 0.80 - 1.30 MG/DL    Est, Glom Filt Rate 62 >60 ml/min/1.73m2    Calcium 8.9 8.8 - 10.2 MG/DL   POCT Glucose    Collection Time: 04/06/25  7:20 AM   Result Value Ref Range    POC Glucose 219 (H) 65 - 100 mg/dL    Performed by: He        No results for input(s): \"COVID19\" in the last 72 hours.    Current Meds:  Current Facility-Administered Medications   Medication Dose Route Frequency    insulin glargine (LANTUS) injection vial 20 Units  20 Units SubCUTAneous BID    insulin lispro (HUMALOG,ADMELOG) injection vial 0-8 Units  0-8 Units SubCUTAneous 4x Daily AC & HS    dextrose bolus 10% 125 mL  125 mL IntraVENous PRN    Or    dextrose bolus 10% 250 mL  250 mL IntraVENous PRN    dextrose 5 % and 0.45 % sodium chloride infusion   IntraVENous Continuous PRN    0.45 % sodium chloride infusion   IntraVENous Continuous    potassium chloride 10 mEq/100 mL IVPB (Peripheral Line)  10 mEq IntraVENous PRN    magnesium sulfate 1000 mg in dextrose 5% 100 mL IVPB  1,000 mg IntraVENous PRN    sodium phosphate 15 mmol in sodium chloride 0.9 % 250 mL IVPB  15 mmol IntraVENous PRN    polyethylene glycol (GLYCOLAX) packet 
Absolute 4.70 1.70 - 8.20 K/UL    Lymphocytes Absolute 1.77 0.50 - 4.60 K/UL    Monocytes Absolute 0.72 0.10 - 1.30 K/UL    Eosinophils Absolute 0.09 0.00 - 0.80 K/UL    Basophils Absolute 0.07 0.00 - 0.20 K/UL    Immature Granulocytes Absolute 0.02 0.0 - 0.5 K/UL   POCT Glucose    Collection Time: 04/05/25  7:47 AM   Result Value Ref Range    POC Glucose 281 (H) 65 - 100 mg/dL    Performed by: KevynCT        No results for input(s): \"COVID19\" in the last 72 hours.    Current Meds:  Current Facility-Administered Medications   Medication Dose Route Frequency    insulin glargine (LANTUS) injection vial 25 Units  25 Units SubCUTAneous Daily    insulin lispro (HUMALOG,ADMELOG) injection vial 0-8 Units  0-8 Units SubCUTAneous 4x Daily AC & HS    dextrose bolus 10% 125 mL  125 mL IntraVENous PRN    Or    dextrose bolus 10% 250 mL  250 mL IntraVENous PRN    dextrose 5 % and 0.45 % sodium chloride infusion   IntraVENous Continuous PRN    0.45 % sodium chloride infusion   IntraVENous Continuous    potassium chloride 10 mEq/100 mL IVPB (Peripheral Line)  10 mEq IntraVENous PRN    magnesium sulfate 1000 mg in dextrose 5% 100 mL IVPB  1,000 mg IntraVENous PRN    sodium phosphate 15 mmol in sodium chloride 0.9 % 250 mL IVPB  15 mmol IntraVENous PRN    polyethylene glycol (GLYCOLAX) packet 17 g  17 g Oral Daily PRN    bisacodyl (DULCOLAX) suppository 10 mg  10 mg Rectal Daily PRN    enoxaparin (LOVENOX) injection 40 mg  40 mg SubCUTAneous QPM    nystatin (MYCOSTATIN) [id number] UNIT/ML suspension 500,000 Units  5 mL Oral 4x Daily       Signed:  Chuckie Marti MD    Part of this note may have been written by using a voice dictation software.  The note has been proof read but may still contain some grammatical/other typographical errors.    
this note may have been written by using a voice dictation software.  The note has been proof read but may still contain some grammatical/other typographical errors.

## 2025-04-07 NOTE — DIABETES MGMT
Patient seen for follow up diabetes education.  Patient states \"I was not doing what I was supposed to be doing but now I have had a wake up call.\"  Reviewed ADA blood glucose and A1C target goals.  Reviewed signs, symptoms, and treatment of hypoglycemia.  Reviewed blood glucose over that past 24 hours ([id number]).  Reviewed current insulin regimen: Lantus 20 units BID and Humalog correctional insulin.  Patient would likely benefit from a simplified diabetes medication regimen at discharge to improve compliance.  Reviewed effects of sweetened beverages on glycemic control and discussed alternative beverages to help improve glycemic control.  The effects of carbohydrates on blood glucose, the \"plate method\" of healthy meal planning, basics of healthy meal plan, Consistent Carbohydrate Diet.  Encouraged compliance with discharge regimen. Encouraged patient to continue to work on lifestyle modifications and to follow up with primary care provider for further titration of regimen. Patient verbalized understanding and voices no further questions regarding diabetes management.

## 2025-04-07 NOTE — PLAN OF CARE
Problem: Chronic Conditions and Co-morbidities  Goal: Patient's chronic conditions and co-morbidity symptoms are monitored and maintained or improved  4/3/2025 2130 by Suzan Barcenas RN  Outcome: Progressing  4/3/2025 1823 by Luba Nj RN  Outcome: Progressing  Flowsheets (Taken 4/3/2025 1615)  Care Plan - Patient's Chronic Conditions and Co-Morbidity Symptoms are Monitored and Maintained or Improved:   Monitor and assess patient's chronic conditions and comorbid symptoms for stability, deterioration, or improvement   Collaborate with multidisciplinary team to address chronic and comorbid conditions and prevent exacerbation or deterioration   Update acute care plan with appropriate goals if chronic or comorbid symptoms are exacerbated and prevent overall improvement and discharge     Problem: Discharge Planning  Goal: Discharge to home or other facility with appropriate resources  4/3/2025 2130 by Suzan Barcenas RN  Outcome: Progressing  4/3/2025 1823 by Luba Nj RN  Outcome: Progressing  Flowsheets (Taken 4/3/2025 1615)  Discharge to home or other facility with appropriate resources:   Identify barriers to discharge with patient and caregiver   Arrange for needed discharge resources and transportation as appropriate   Identify discharge learning needs (meds, wound care, etc)   Refer to discharge planning if patient needs post-hospital services based on physician order or complex needs related to functional status, cognitive ability or social support system     Problem: Skin/Tissue Integrity  Goal: Skin integrity remains intact  Description: 1.  Monitor for areas of redness and/or skin breakdown  2.  Assess vascular access sites hourly  3.  Every 4-6 hours minimum:  Change oxygen saturation probe site  4.  Every 4-6 hours:  If on nasal continuous positive airway pressure, respiratory therapy assess nares and determine need for appliance change or resting period  4/3/2025 2130 by Suzan Barcenas 
  Problem: Chronic Conditions and Co-morbidities  Goal: Patient's chronic conditions and co-morbidity symptoms are monitored and maintained or improved  Outcome: Progressing     Problem: Discharge Planning  Goal: Discharge to home or other facility with appropriate resources  Outcome: Progressing     Problem: Skin/Tissue Integrity  Goal: Skin integrity remains intact  Description: 1.  Monitor for areas of redness and/or skin breakdown  2.  Assess vascular access sites hourly  3.  Every 4-6 hours minimum:  Change oxygen saturation probe site  4.  Every 4-6 hours:  If on nasal continuous positive airway pressure, respiratory therapy assess nares and determine need for appliance change or resting period  Outcome: Progressing     Problem: Respiratory - Adult  Goal: Achieves optimal ventilation and oxygenation  Outcome: Progressing     Problem: Cardiovascular - Adult  Goal: Absence of cardiac dysrhythmias or at baseline  Outcome: Progressing     Problem: Skin/Tissue Integrity - Adult  Goal: Skin integrity remains intact  Description: 1.  Monitor for areas of redness and/or skin breakdown  2.  Assess vascular access sites hourly  3.  Every 4-6 hours minimum:  Change oxygen saturation probe site  4.  Every 4-6 hours:  If on nasal continuous positive airway pressure, respiratory therapy assess nares and determine need for appliance change or resting period  Outcome: Progressing  Goal: Oral mucous membranes remain intact  Outcome: Progressing     Problem: Musculoskeletal - Adult  Goal: Return mobility to safest level of function  Outcome: Progressing     Problem: Gastrointestinal - Adult  Goal: Minimal or absence of nausea and vomiting  Outcome: Progressing     Problem: Genitourinary - Adult  Goal: Absence of urinary retention  Outcome: Progressing     Problem: Infection - Adult  Goal: Absence of infection at discharge  Outcome: Progressing     Problem: Metabolic/Fluid and Electrolytes - Adult  Goal: Electrolytes maintained 
  Problem: Chronic Conditions and Co-morbidities  Goal: Patient's chronic conditions and co-morbidity symptoms are monitored and maintained or improved  Outcome: Progressing     Problem: Discharge Planning  Goal: Discharge to home or other facility with appropriate resources  Outcome: Progressing     Problem: Skin/Tissue Integrity  Goal: Skin integrity remains intact  Description: 1.  Monitor for areas of redness and/or skin breakdown  2.  Assess vascular access sites hourly  3.  Every 4-6 hours minimum:  Change oxygen saturation probe site  4.  Every 4-6 hours:  If on nasal continuous positive airway pressure, respiratory therapy assess nares and determine need for appliance change or resting period  Outcome: Progressing  Flowsheets (Taken 4/7/2025 0835)  Skin Integrity Remains Intact: Monitor for areas of redness and/or skin breakdown     Problem: Respiratory - Adult  Goal: Achieves optimal ventilation and oxygenation  Outcome: Progressing  Flowsheets (Taken 4/7/2025 0835)  Achieves optimal ventilation and oxygenation: Assess for changes in respiratory status     Problem: Cardiovascular - Adult  Goal: Absence of cardiac dysrhythmias or at baseline  Outcome: Progressing  Flowsheets (Taken 4/7/2025 0835)  Absence of cardiac dysrhythmias or at baseline: Monitor cardiac rate and rhythm     Problem: Skin/Tissue Integrity - Adult  Goal: Skin integrity remains intact  Description: 1.  Monitor for areas of redness and/or skin breakdown  2.  Assess vascular access sites hourly  3.  Every 4-6 hours minimum:  Change oxygen saturation probe site  4.  Every 4-6 hours:  If on nasal continuous positive airway pressure, respiratory therapy assess nares and determine need for appliance change or resting period  Outcome: Progressing  Flowsheets (Taken 4/7/2025 0835)  Skin Integrity Remains Intact: Monitor for areas of redness and/or skin breakdown  Goal: Oral mucous membranes remain intact  Outcome: Progressing  Flowsheets (Taken 
  Problem: Chronic Conditions and Co-morbidities  Goal: Patient's chronic conditions and co-morbidity symptoms are monitored and maintained or improved  Outcome: Progressing  Flowsheets (Taken 4/3/2025 1615)  Care Plan - Patient's Chronic Conditions and Co-Morbidity Symptoms are Monitored and Maintained or Improved:   Monitor and assess patient's chronic conditions and comorbid symptoms for stability, deterioration, or improvement   Collaborate with multidisciplinary team to address chronic and comorbid conditions and prevent exacerbation or deterioration   Update acute care plan with appropriate goals if chronic or comorbid symptoms are exacerbated and prevent overall improvement and discharge     Problem: Discharge Planning  Goal: Discharge to home or other facility with appropriate resources  Outcome: Progressing  Flowsheets (Taken 4/3/2025 1615)  Discharge to home or other facility with appropriate resources:   Identify barriers to discharge with patient and caregiver   Arrange for needed discharge resources and transportation as appropriate   Identify discharge learning needs (meds, wound care, etc)   Refer to discharge planning if patient needs post-hospital services based on physician order or complex needs related to functional status, cognitive ability or social support system     
within normal limits  Outcome: Progressing  Goal: Hemodynamic stability and optimal renal function maintained  Outcome: Progressing  Goal: Glucose maintained within prescribed range  Outcome: Progressing     Problem: Hematologic - Adult  Goal: Maintains hematologic stability  Outcome: Progressing     Problem: Safety - Adult  Goal: Free from fall injury  Outcome: Progressing     Problem: Pain  Goal: Verbalizes/displays adequate comfort level or baseline comfort level  Outcome: Progressing     
within normal limits  Outcome: Progressing  Goal: Hemodynamic stability and optimal renal function maintained  Outcome: Progressing  Goal: Glucose maintained within prescribed range  Outcome: Progressing     Problem: Hematologic - Adult  Goal: Maintains hematologic stability  Outcome: Progressing     Problem: Safety - Adult  Goal: Free from fall injury  Outcome: Progressing  Flowsheets (Taken 4/6/2025 1131 by Rama Hill RN)  Free From Fall Injury: Instruct family/caregiver on patient safety     Problem: Pain  Goal: Verbalizes/displays adequate comfort level or baseline comfort level  Outcome: Progressing     
Verbalizes/displays adequate comfort level or baseline comfort level  4/4/2025 2252 by Chandni Hampton, RN  Outcome: Progressing  4/4/2025 1517 by Karen Arango, RN  Outcome: Progressing     
within normal limits  4/7/2025 1356 by Yumiko Alston RN  Outcome: Completed  4/7/2025 1353 by Yumiko Alston RN  Outcome: Progressing  Goal: Hemodynamic stability and optimal renal function maintained  4/7/2025 1356 by Yumiko Alston RN  Outcome: Completed  4/7/2025 1353 by Yumiko Alston RN  Outcome: Progressing  Goal: Glucose maintained within prescribed range  4/7/2025 1356 by Yumiko Alston RN  Outcome: Completed  4/7/2025 1353 by Yumiko Alston RN  Outcome: Progressing     Problem: Hematologic - Adult  Goal: Maintains hematologic stability  4/7/2025 1356 by Yumiko Alston RN  Outcome: Completed  4/7/2025 1353 by Yumiko Alston RN  Outcome: Progressing     Problem: Safety - Adult  Goal: Free from fall injury  4/7/2025 1356 by Yumiko Alston RN  Outcome: Completed  4/7/2025 1353 by Yumiko Alston RN  Outcome: Progressing  Flowsheets (Taken 4/7/2025 0835)  Free From Fall Injury: Instruct family/caregiver on patient safety     Problem: Pain  Goal: Verbalizes/displays adequate comfort level or baseline comfort level  4/7/2025 1356 by Yumiko Alston RN  Outcome: Completed  4/7/2025 1353 by Yumiko Alston RN  Outcome: Progressing  Flowsheets (Taken 4/7/2025 0835)  Verbalizes/displays adequate comfort level or baseline comfort level: Encourage patient to monitor pain and request assistance

## 2025-04-07 NOTE — CARE COORDINATION
Patient with discharge order for today. Referral made to [facility] (RN/PT/OT). Patient has met all treatment goals and milestones for discharge. Patient/family in agreement with discharge plan. Family to provide transportation home. CM following until patient is discharged.      04/07/25 1546   Services At/After Discharge   Transition of Care Consult (CM Consult) Discharge Planning   Services At/After Discharge Home [facility] Resource Information Provided? No   Mode of Transport at Discharge Self   Confirm Follow Up Transport Self   Condition of Participation: Discharge Planning   The Plan for Transition of Care is related to the following treatment goals: Return to baseline   The Patient and/or Patient Representative was provided with a Choice of Provider? Patient   The Patient and/Or Patient Representative agree with the Discharge Plan? Yes   Freedom of Choice list was provided with basic dialogue that supports the patient's individualized plan of care/goals, treatment preferences, and shares the quality data associated with the providers?  Yes       
Return to Present Housing: (P) Unknown at present  Other Identified Issues/Barriers to RETURNING to current housing: Pending  Potential Assistance needed at discharge: (P) Skilled Nursing Facility, Home Care            Potential DME:  Pending  Patient expects to discharge to:  Pending  Plan for transportation at discharge:  Pending    Financial    Payor: MEDICARE / Plan: MEDICARE PART A AND B / Product Type: *No Product type* /     Does insurance require precert for SNF: No    Potential assistance Purchasing Medications: (P) No  Meds-to-Beds request:        [id number] - [place], [place] - [address] [place] [address] -  [phone] - F [phone]  [address] [place] [id number]  Phone: [phone] Fax: [phone]      Notes:    Factors facilitating achievement of predicted outcomes: Friend support, Cooperative, and Pleasant    Barriers to discharge: Pending medical treatment    Additional Case Management Notes: Chart reviewed and pt seen in ICU s/p admission hyperkalemia/DKA. Alert and oriented. Confirms demographic/screen. Pt lives alone. Independent of ADL's but realizing he needs more assist. Asking about custodial. Senior Living info to be given. We also discussed CLTC through [facility] which he would have to apply for through DSS.  Also states he was declined for food stamps. Discussed MOW and getting him information for this. Discussed HCPOA. Pt , states he has one daughter, Mary?. Encouraged to complete new document as no contact with daughter for 20 years. Aware CM to follow for SUPRIYA needs/POC. Discussed pt with RNCM as pt tx to 2nd floor.       The Plan for Transition of Care is related to the following treatment goals of Hyperkalemia [E87.5]  Acute kidney injury [N17.9]  DKA, type 2, not at goal (HCC) [E11.10]  Type 1 diabetes mellitus with ketoacidosis without coma (HCC) [E10.10]
